# Patient Record
Sex: MALE | Race: WHITE | NOT HISPANIC OR LATINO | Employment: OTHER | URBAN - METROPOLITAN AREA
[De-identification: names, ages, dates, MRNs, and addresses within clinical notes are randomized per-mention and may not be internally consistent; named-entity substitution may affect disease eponyms.]

---

## 2017-02-01 RX ORDER — FUROSEMIDE 20 MG/1
TABLET ORAL
Qty: 30 TABLET | Refills: 3 | Status: SHIPPED | OUTPATIENT
Start: 2017-02-01 | End: 2017-03-21

## 2017-02-13 RX ORDER — CLONIDINE HYDROCHLORIDE 0.1 MG/1
TABLET ORAL
Qty: 60 TABLET | Refills: 0 | OUTPATIENT
Start: 2017-02-13

## 2017-02-22 RX ORDER — BENAZEPRIL HYDROCHLORIDE AND HYDROCHLOROTHIAZIDE 20; 12.5 MG/1; MG/1
TABLET ORAL
Qty: 90 TABLET | Refills: 3 | Status: SHIPPED | OUTPATIENT
Start: 2017-02-22 | End: 2017-10-20 | Stop reason: SDUPTHER

## 2017-03-14 RX ORDER — CLONIDINE HYDROCHLORIDE 0.1 MG/1
TABLET ORAL
Qty: 60 TABLET | Refills: 0 | Status: SHIPPED | OUTPATIENT
Start: 2017-03-14 | End: 2017-10-20 | Stop reason: SDUPTHER

## 2017-03-21 ENCOUNTER — OFFICE VISIT (OUTPATIENT)
Dept: FAMILY MEDICINE CLINIC | Facility: CLINIC | Age: 82
End: 2017-03-21

## 2017-03-21 VITALS
SYSTOLIC BLOOD PRESSURE: 142 MMHG | OXYGEN SATURATION: 92 % | TEMPERATURE: 98.5 F | DIASTOLIC BLOOD PRESSURE: 64 MMHG | HEART RATE: 58 BPM | RESPIRATION RATE: 16 BRPM | WEIGHT: 142.6 LBS | HEIGHT: 66 IN | BODY MASS INDEX: 22.92 KG/M2

## 2017-03-21 DIAGNOSIS — F51.01 PRIMARY INSOMNIA: ICD-10-CM

## 2017-03-21 DIAGNOSIS — R53.82 CHRONIC FATIGUE: ICD-10-CM

## 2017-03-21 DIAGNOSIS — M79.89 SWELLING OF BOTH LOWER EXTREMITIES: ICD-10-CM

## 2017-03-21 DIAGNOSIS — I10 ESSENTIAL HYPERTENSION: Primary | ICD-10-CM

## 2017-03-21 PROCEDURE — 99214 OFFICE O/P EST MOD 30 MIN: CPT | Performed by: INTERNAL MEDICINE

## 2017-03-21 RX ORDER — TRAZODONE HYDROCHLORIDE 50 MG/1
50 TABLET ORAL NIGHTLY
Qty: 30 TABLET | Refills: 5 | Status: SHIPPED | OUTPATIENT
Start: 2017-03-21 | End: 2017-10-20

## 2017-03-21 RX ORDER — FUROSEMIDE 40 MG/1
40 TABLET ORAL DAILY
Qty: 30 TABLET | Refills: 5 | Status: SHIPPED | OUTPATIENT
Start: 2017-03-21 | End: 2018-05-19 | Stop reason: SDUPTHER

## 2017-03-21 NOTE — PROGRESS NOTES
Subjective   Patient ID: Maximo Gonzalez is a 87 y.o. male presents with   Chief Complaint   Patient presents with   • Hypertension     f/u   • Leg Swelling     on both legs and ankles swollen and have been for the past 2 days and he has been taking lasix        HPI -  this patient presents today for follow-up of hypertension lower extremity swelling and insomnia and fatigue and hyperlipidemia.  He has a implanted dye lauded pump and I suspect that is causing his pretty severe lower extremity edema.  Lasix 20 is not handling his edema.  He complains of swelling and pain in his lower extremities from the edema.  Blood pressure is pretty well controlled with current regimen.    Assessment plan    Essential hypertension continue hydralazine and labetalol Lotensin HCT    Fatigue we'll get labs in a few weeks after he's been on his new dose Lasix    Extremity edema increase Lasix to 40 daily reviewing his CMP doesn't appear to need potassium at this point because he is on the high end of normal.  I'll recheck a metabolic panel in a few weeks.    Insomnia-start trazodone 50.    No Known Allergies    The following portions of the patient's history were reviewed and updated as appropriate: allergies, current medications, past family history, past medical history, past social history, past surgical history and problem list.      Review of Systems   Constitutional: Negative.    HENT: Negative.    Eyes: Negative.    Respiratory: Negative.    Cardiovascular: Positive for leg swelling.   Gastrointestinal: Negative.    Endocrine: Negative.    Genitourinary: Negative.    Musculoskeletal: Negative.    Skin: Negative.    Allergic/Immunologic: Negative.    Neurological: Negative.    Hematological: Negative.    Psychiatric/Behavioral: Positive for sleep disturbance.       Objective     Vitals:    03/21/17 1317   BP: 142/64   Pulse: 58   Resp: 16   Temp: 98.5 °F (36.9 °C)   TempSrc: Oral   SpO2: 92%   Weight: 142 lb 9.6 oz (64.7 kg)  "  Height: 66\" (167.6 cm)         Physical Exam   Constitutional: He is oriented to person, place, and time. He appears well-developed and well-nourished.   HENT:   Head: Normocephalic and atraumatic.   Eyes: EOM are normal. Pupils are equal, round, and reactive to light.   Cardiovascular: Normal rate, regular rhythm and normal heart sounds.    Pulmonary/Chest: Effort normal and breath sounds normal.   Musculoskeletal: He exhibits edema.   Neurological: He is alert and oriented to person, place, and time.   Psychiatric: He has a normal mood and affect. His behavior is normal.   Nursing note and vitals reviewed.        Maximo was seen today for hypertension and leg swelling.    Diagnoses and all orders for this visit:    Essential hypertension  -     Lipid Panel  -     Comprehensive Metabolic Panel  -     CBC & Differential    Swelling of both lower extremities  -     Lipid Panel  -     Comprehensive Metabolic Panel  -     CBC & Differential    Primary insomnia  -     Lipid Panel  -     Comprehensive Metabolic Panel  -     CBC & Differential    Chronic fatigue  -     Lipid Panel  -     Comprehensive Metabolic Panel  -     CBC & Differential    Other orders  -     furosemide (LASIX) 40 MG tablet; Take 1 tablet by mouth Daily.  -     traZODone (DESYREL) 50 MG tablet; Take 1 tablet by mouth Every Night.        Call or return to clinic prn if these symptoms worsen or fail to improve as anticipated.  "

## 2017-04-11 LAB
ALBUMIN SERPL-MCNC: 4.6 G/DL (ref 3.5–5.2)
ALBUMIN/GLOB SERPL: 1.8 G/DL
ALP SERPL-CCNC: 80 U/L (ref 39–117)
ALT SERPL-CCNC: 21 U/L (ref 1–41)
AST SERPL-CCNC: 18 U/L (ref 1–40)
BASOPHILS # BLD AUTO: 0.05 10*3/MM3 (ref 0–0.2)
BASOPHILS NFR BLD AUTO: 0.8 % (ref 0–1.5)
BILIRUB SERPL-MCNC: 0.3 MG/DL (ref 0.1–1.2)
BUN SERPL-MCNC: 46 MG/DL (ref 8–23)
BUN/CREAT SERPL: 36.8 (ref 7–25)
CALCIUM SERPL-MCNC: 10.4 MG/DL (ref 8.6–10.5)
CHLORIDE SERPL-SCNC: 99 MMOL/L (ref 98–107)
CHOLEST SERPL-MCNC: 160 MG/DL (ref 0–200)
CO2 SERPL-SCNC: 26.2 MMOL/L (ref 22–29)
CREAT SERPL-MCNC: 1.25 MG/DL (ref 0.76–1.27)
EOSINOPHIL # BLD AUTO: 0.35 10*3/MM3 (ref 0–0.7)
EOSINOPHIL NFR BLD AUTO: 5.5 % (ref 0.3–6.2)
ERYTHROCYTE [DISTWIDTH] IN BLOOD BY AUTOMATED COUNT: 12.9 % (ref 11.5–14.5)
GLOBULIN SER CALC-MCNC: 2.5 GM/DL
GLUCOSE SERPL-MCNC: 114 MG/DL (ref 65–99)
HCT VFR BLD AUTO: 36 % (ref 40.4–52.2)
HDLC SERPL-MCNC: 65 MG/DL (ref 40–60)
HGB BLD-MCNC: 11 G/DL (ref 13.7–17.6)
IMM GRANULOCYTES # BLD: 0 10*3/MM3 (ref 0–0.03)
IMM GRANULOCYTES NFR BLD: 0 % (ref 0–0.5)
LDLC SERPL CALC-MCNC: 73 MG/DL (ref 0–100)
LYMPHOCYTES # BLD AUTO: 2.19 10*3/MM3 (ref 0.9–4.8)
LYMPHOCYTES NFR BLD AUTO: 34.4 % (ref 19.6–45.3)
MCH RBC QN AUTO: 28.7 PG (ref 27–32.7)
MCHC RBC AUTO-ENTMCNC: 30.6 G/DL (ref 32.6–36.4)
MCV RBC AUTO: 94 FL (ref 79.8–96.2)
MONOCYTES # BLD AUTO: 0.64 10*3/MM3 (ref 0.2–1.2)
MONOCYTES NFR BLD AUTO: 10.1 % (ref 5–12)
NEUTROPHILS # BLD AUTO: 3.13 10*3/MM3 (ref 1.9–8.1)
NEUTROPHILS NFR BLD AUTO: 49.2 % (ref 42.7–76)
PLATELET # BLD AUTO: 286 10*3/MM3 (ref 140–500)
POTASSIUM SERPL-SCNC: 5.1 MMOL/L (ref 3.5–5.2)
PROT SERPL-MCNC: 7.1 G/DL (ref 6–8.5)
RBC # BLD AUTO: 3.83 10*6/MM3 (ref 4.6–6)
SODIUM SERPL-SCNC: 141 MMOL/L (ref 136–145)
TRIGL SERPL-MCNC: 110 MG/DL (ref 0–150)
VLDLC SERPL CALC-MCNC: 22 MG/DL (ref 5–40)
WBC # BLD AUTO: 6.36 10*3/MM3 (ref 4.5–10.7)

## 2017-04-13 ENCOUNTER — TELEPHONE (OUTPATIENT)
Dept: FAMILY MEDICINE CLINIC | Facility: CLINIC | Age: 82
End: 2017-04-13

## 2017-04-13 NOTE — TELEPHONE ENCOUNTER
Mary wants you to call her with her dads lab results, she said they are used to [facility] always calling them back with these and since her dad is [place] can we contact her?

## 2017-04-13 NOTE — TELEPHONE ENCOUNTER
Please inform the patient's daughter that carls fasting sugar was 114 creatinine stable at 1.25 total cholesterol good at 160 triglycerides good at 110, mild anemia with a hemoglobin of 11 but it's better than last year.  Liver function test normal.

## 2017-04-20 ENCOUNTER — OFFICE VISIT (OUTPATIENT)
Dept: CARDIOLOGY | Facility: CLINIC | Age: 82
End: 2017-04-20

## 2017-04-20 VITALS
HEART RATE: 57 BPM | HEIGHT: 66 IN | DIASTOLIC BLOOD PRESSURE: 76 MMHG | WEIGHT: 140 LBS | BODY MASS INDEX: 22.5 KG/M2 | SYSTOLIC BLOOD PRESSURE: 150 MMHG

## 2017-04-20 DIAGNOSIS — I10 ESSENTIAL HYPERTENSION: ICD-10-CM

## 2017-04-20 DIAGNOSIS — E78.49 OTHER HYPERLIPIDEMIA: ICD-10-CM

## 2017-04-20 DIAGNOSIS — I25.10 CORONARY ARTERY DISEASE INVOLVING NATIVE CORONARY ARTERY OF NATIVE HEART WITHOUT ANGINA PECTORIS: Primary | ICD-10-CM

## 2017-04-20 DIAGNOSIS — I73.9 PERIPHERAL VASCULAR DISEASE (HCC): ICD-10-CM

## 2017-04-20 PROCEDURE — 99214 OFFICE O/P EST MOD 30 MIN: CPT | Performed by: INTERNAL MEDICINE

## 2017-04-20 PROCEDURE — 93000 ELECTROCARDIOGRAM COMPLETE: CPT | Performed by: INTERNAL MEDICINE

## 2017-04-20 NOTE — PROGRESS NOTES
Date of Office Visit: 17  Encounter Provider: Jaxon Shaw MD  Place of Service: [facility] CARDIOLOGY  Patient Name: Maximo Gonzalez  :3/9/1930      Chief Complaint   Patient presents with   • Coronary Artery Disease     History of Present Illness  HPI Comments:  Mr. Gonzalez is a pleasant 87-year-old gentleman with a history of coronary  artery disease. The patient had positive stress testing. Cardiac  catheterization showed severe disease of the left anterior descending. He  had angioplasty and stent placement in two areas of the LAD in 2001. He then had a positive perfusion study with evidence of restenosis,  repeat angioplasty of the LAD back in 2002. Then in 2005, he  had increasing atypical symptoms, hypertension and repeat catheterization  showed no real significant coronary disease normal LV function and normal  renal arteriogram. In 2006, he was having claudication and he was  found to have right superficial femoral stenosis. He had atherectomy of  that. He most recently was in the hospital for repeat right carotid stenosis  with repeat right carotid endarterectomy. He comes in for followup .  He has had no chest pain or pressure. No shortness of breath orthopnea or PND.  No palpitations near syncope or syncope.  He has had some lower extremity edemas diuretics were recently increased.  He is try to walk now 15 miles a day around his condo place.  He does live alone but his daughter helps care for him a lot.  He is continuing to lose his vision and hearing but all in all he feels like he's stable.      Coronary Artery Disease   Pertinent negatives include no dizziness, muscle weakness or weight gain. There is no history of past myocardial infarction.         Past Medical History:   Diagnosis Date   • Atherosclerotic heart disease of native coronary artery without angina pectoris     Normal LV systolic function.   • BPH (benign  prostatic hypertrophy)    • Chronic pain syndrome    • Colon cancer    • Degeneration of cervical intervertebral disc     Cervical disk disease.   • Gastritis    • Hypertension    • Renal artery stenosis     renal artery disease   • Stroke    • Vascular disease          Past Surgical History:   Procedure Laterality Date   • ATHERECTOMY Right     superficial femoral artery   • CAROTID ENDARTERECTOMY      Right carotid endarterectomy. ReOP 8/11   • CERVICAL LAMINECTOMY     • CHOLECYSTECTOMY     • COLECTOMY PARTIAL / TOTAL      colon resection   • CORONARY ANGIOPLASTY WITH STENT PLACEMENT      Angioplasty and stent placement in LAD and evidence of restenosis w/ repeat angioplasty in April 2002.  Cath in 2005 showed no evidence of restenosis.   • LAPAROSCOPIC INGUINAL HERNIA REPAIR Left 02/19/2016    Dr. Neil Thomas   • OTHER SURGICAL HISTORY      Bypass Graft (non-vein) iliorenal   • TURP / TRANSURETHRAL INCISION / DRAINAGE PROSTATE           Current Outpatient Prescriptions on File Prior to Visit   Medication Sig Dispense Refill   • aspirin 81 MG tablet Take 1 tablet by mouth daily.     • benazepril-hydrochlorthiazide (LOTENSIN HCT) 20-12.5 MG per tablet TAKE ONE TABLET BY MOUTH ONCE DAILY 90 tablet 3   • CloNIDine (CATAPRES) 0.1 MG tablet TAKE ONE TABLET BY MOUTH TWICE DAILY AS NEEDED FOR VERY HIGH BLOOD PRESSURE 60 tablet 0   • furosemide (LASIX) 40 MG tablet Take 1 tablet by mouth Daily. 30 tablet 5   • hydrALAZINE (APRESOLINE) 50 MG tablet TAKE ONE TABLET BY MOUTH TWICE DAILY 60 tablet 2   • labetalol (NORMODYNE) 200 MG tablet Take 1 tablet by mouth 2 (Two) Times a Day. Indications: High Blood Pressure, Generic version please (Patient taking differently: Take 200 mg by mouth Daily. Indications: High Blood Pressure, Generic version please) 60 tablet 5   • oxyCODONE-acetaminophen (PERCOCET) 5-325 MG per tablet Take 1 tablet by mouth every 4 (four) hours as needed. For pain     • promethazine (PHENERGAN) 25 MG  tablet Take 1 tablet by mouth 2 (two) times a day as needed. For nausea and vomiting     • traZODone (DESYREL) 50 MG tablet Take 1 tablet by mouth Every Night. 30 tablet 5     No current facility-administered medications on file prior to visit.          Social History     Social History   • Marital status:      Spouse name: N/A   • Number of children: N/A   • Years of education: N/A     Occupational History   • Not on file.     Social History Main Topics   • Smoking status: Former Smoker     Types: Cigarettes     Quit date: 1/27/1986   • Smokeless tobacco: Never Used   • Alcohol use No   • Drug use: No   • Sexual activity: Defer     Other Topics Concern   • Not on file     Social History Narrative       Family History   Problem Relation Age of Onset   • Cancer Mother      Breast   • No Known Problems Father          Review of Systems   Constitution: Negative for decreased appetite, diaphoresis, fever, weakness, malaise/fatigue, weight gain and weight loss.   HENT: Positive for hearing loss. Negative for congestion, headaches, nosebleeds and tinnitus.    Eyes: Positive for vision loss in left eye and vision loss in right eye. Negative for blurred vision, double vision and visual disturbance.   Cardiovascular:        As noted in HPI   Respiratory:        As noted HPI   Endocrine: Negative for cold intolerance and heat intolerance.   Hematologic/Lymphatic: Negative for bleeding problem. Does not bruise/bleed easily.   Skin: Negative for color change, flushing, itching and rash.   Musculoskeletal: Negative for arthritis, back pain, joint pain, joint swelling, muscle weakness and myalgias.   Gastrointestinal: Negative for bloating, abdominal pain, constipation, diarrhea, dysphagia, heartburn, hematemesis, hematochezia, melena, nausea and vomiting.   Genitourinary: Negative for bladder incontinence, dysuria, frequency, nocturia and urgency.   Neurological: Negative for dizziness, focal weakness, light-headedness,  "loss of balance, numbness, paresthesias and vertigo.   Psychiatric/Behavioral: Negative for depression, memory loss and substance abuse.       Procedures      ECG 12 Lead  Date/Time: 4/20/2017 2:39 PM  Performed by: RONY FRANKLIN  Authorized by: RONY FRANKLIN   Comparison: compared with previous ECG   Similar to previous ECG  Rhythm: sinus rhythm  Rate: normal  QRS axis: normal                 Objective:    /76 (BP Location: Left arm, Patient Position: Sitting)  Pulse 57  Ht 66\" (167.6 cm)  Wt 140 lb (63.5 kg)  BMI 22.6 kg/m2       Physical Exam  Physical Exam   Constitutional: He is oriented to person, place, and time. He appears well-developed and well-nourished. No distress.   HENT:   Head: Normocephalic.   Eyes: Conjunctivae are normal. Pupils are equal, round, and reactive to light. No scleral icterus.   Neck: Normal carotid pulses, no hepatojugular reflux and no JVD present. Carotid bruit is not present. No tracheal deviation, no edema and no erythema present. No thyromegaly present.   Cardiovascular: Normal rate, regular rhythm, S1 normal, S2 normal and intact distal pulses.   No extrasystoles are present. PMI is not displaced.  Exam reveals no gallop, no distant heart sounds and no friction rub.    Murmur heard.   Systolic murmur is present with a grade of 2/6  at the apex  Pulses:       Carotid pulses are 2+ on the right side with bruit, and 2+ on the left side with bruit.       Radial pulses are 2+ on the right side, and 2+ on the left side.        Femoral pulses are 2+ on the right side, and 2+ on the left side.       Dorsalis pedis pulses are 2+ on the right side, and 2+ on the left side.        Posterior tibial pulses are 2+ on the right side, and 2+ on the left side.   Pulmonary/Chest: Effort normal and breath sounds normal. No respiratory distress. He has no decreased breath sounds. He has no wheezes. He has no rhonchi. He has no rales. He exhibits no tenderness.   Abdominal: Soft. " Bowel sounds are normal. He exhibits no distension and no mass. There is no hepatosplenomegaly. There is no tenderness. There is no rebound and no guarding.   Musculoskeletal: He exhibits edema (1+ bilateral tibial). He exhibits no tenderness or deformity.   Neurological: He is alert and oriented to person, place, and time.   Skin: Skin is warm and dry. No rash noted. He is not diaphoretic. No cyanosis or erythema. No pallor. Nails show no clubbing.   Psychiatric: He has a normal mood and affect. His speech is normal and behavior is normal. Judgment and thought content normal.           Assessment:   1. This an 85-year-old gentleman with history of severe coronary disease, preserved left ventricular systolic function, and previous angioplasty and  stent placement of the left anterior descending. He then had evidence of restenosis with repeat angioplasty in April 2002. Catheterization in 2005  showed no evidence of restenosis. He had a PET perfusion study done in March 2011 that showed no evidence of ischemia and preserved left ventricular  function.   Coronary Artery Disease  Assessment  • The patient has no angina    Plan  • Lifestyle modifications discussed include adhering to a heart healthy diet, avoidance of tobacco products, maintenance of a healthy weight, medication compliance, regular exercise and regular monitoring of cholesterol and blood pressure    Subjective - Objective  • There has been a previous stent procedure using OCTAVIA  • Current antiplatelet therapy includes aspirin 81 mg    He is now doing well. At this time, we will continue the same and see him again in follow-up in about a year.    2. Peripheral vascular disease status post atherectomy of the right superficial femoral artery. He is to follow up with vascular surgery.  3. Cerebrovascular disease status post right carotid endarterectomy.  Restenosis with repeat right carotid endarterectomy in August 2011. He follows with vascular surgery.  4.  History of renal artery stenosis status post renal artery bypass with no evidence of restenosis.  5. History of colon cancer status post colon resection.  6. Hyperlipidemia. I still feel that he would benefit from statin therapy.  7.Hypertension.   BP under better control.         Plan:

## 2017-05-11 RX ORDER — HYDRALAZINE HYDROCHLORIDE 50 MG/1
TABLET, FILM COATED ORAL
Qty: 60 TABLET | Refills: 0 | Status: SHIPPED | OUTPATIENT
Start: 2017-05-11 | End: 2017-07-26 | Stop reason: SDUPTHER

## 2017-07-18 RX ORDER — PROMETHAZINE HYDROCHLORIDE 25 MG/1
TABLET ORAL
Qty: 60 TABLET | Refills: 0 | Status: SHIPPED | OUTPATIENT
Start: 2017-07-18 | End: 2017-10-20 | Stop reason: SDUPTHER

## 2017-07-26 RX ORDER — HYDRALAZINE HYDROCHLORIDE 50 MG/1
TABLET, FILM COATED ORAL
Qty: 60 TABLET | Refills: 0 | Status: SHIPPED | OUTPATIENT
Start: 2017-07-26 | End: 2017-09-26 | Stop reason: SDUPTHER

## 2017-09-26 RX ORDER — HYDRALAZINE HYDROCHLORIDE 50 MG/1
TABLET, FILM COATED ORAL
Qty: 60 TABLET | Refills: 0 | Status: SHIPPED | OUTPATIENT
Start: 2017-09-26 | End: 2017-10-20 | Stop reason: SDUPTHER

## 2017-10-20 ENCOUNTER — ANESTHESIA EVENT (OUTPATIENT)
Dept: PERIOP | Facility: HOSPITAL | Age: 82
End: 2017-10-20

## 2017-10-20 ENCOUNTER — EMERGENCY ROOM – HOSPITAL (OUTPATIENT)
Dept: URBAN - METROPOLITAN AREA HOSPITAL 112 | Facility: HOSPITAL | Age: 82
End: 2017-10-20

## 2017-10-20 ENCOUNTER — HOSPITAL ENCOUNTER (EMERGENCY)
Facility: HOSPITAL | Age: 82
Discharge: HOME | End: 2017-10-20
Attending: EMERGENCY MEDICINE | Admitting: INTERNAL MEDICINE

## 2017-10-20 ENCOUNTER — APPOINTMENT (OUTPATIENT)
Dept: GENERAL RADIOLOGY | Facility: HOSPITAL | Age: 82
End: 2017-10-20
Attending: EMERGENCY MEDICINE

## 2017-10-20 ENCOUNTER — APPOINTMENT (OUTPATIENT)
Dept: GENERAL RADIOLOGY | Facility: HOSPITAL | Age: 82
End: 2017-10-20

## 2017-10-20 ENCOUNTER — ANESTHESIA (OUTPATIENT)
Dept: PERIOP | Facility: HOSPITAL | Age: 82
End: 2017-10-20

## 2017-10-20 VITALS
HEART RATE: 69 BPM | HEIGHT: 66 IN | WEIGHT: 140 LBS | TEMPERATURE: 97.9 F | SYSTOLIC BLOOD PRESSURE: 187 MMHG | BODY MASS INDEX: 22.5 KG/M2 | DIASTOLIC BLOOD PRESSURE: 82 MMHG | OXYGEN SATURATION: 98 % | RESPIRATION RATE: 16 BRPM

## 2017-10-20 DIAGNOSIS — K22.2 ESOPHAGEAL OBSTRUCTION DUE TO FOOD IMPACTION: Primary | ICD-10-CM

## 2017-10-20 DIAGNOSIS — K22.2 ESOPHAGEAL OBSTRUCTION: ICD-10-CM

## 2017-10-20 DIAGNOSIS — T18.128A ESOPHAGEAL OBSTRUCTION DUE TO FOOD IMPACTION: Primary | ICD-10-CM

## 2017-10-20 DIAGNOSIS — K31.7 POLYP OF STOMACH AND DUODENUM: ICD-10-CM

## 2017-10-20 DIAGNOSIS — T18.128A FOOD IN ESOPHAGUS CAUSING OTHER INJURY, INITIAL ENCOUNTER: ICD-10-CM

## 2017-10-20 LAB
ALBUMIN SERPL-MCNC: 4.6 G/DL (ref 3.5–5.2)
ALBUMIN/GLOB SERPL: 1.6 G/DL
ALP SERPL-CCNC: 74 U/L (ref 39–117)
ALT SERPL W P-5'-P-CCNC: 21 U/L (ref 1–41)
ANION GAP SERPL CALCULATED.3IONS-SCNC: 14 MMOL/L
AST SERPL-CCNC: 25 U/L (ref 1–40)
BASOPHILS # BLD AUTO: 0.03 10*3/MM3 (ref 0–0.2)
BASOPHILS NFR BLD AUTO: 0.5 % (ref 0–1.5)
BILIRUB SERPL-MCNC: 0.6 MG/DL (ref 0.1–1.2)
BUN BLD-MCNC: 28 MG/DL (ref 8–23)
BUN/CREAT SERPL: 26.7 (ref 7–25)
CALCIUM SPEC-SCNC: 10.9 MG/DL (ref 8.6–10.5)
CHLORIDE SERPL-SCNC: 98 MMOL/L (ref 98–107)
CO2 SERPL-SCNC: 29 MMOL/L (ref 22–29)
CREAT BLD-MCNC: 1.05 MG/DL (ref 0.76–1.27)
DEPRECATED RDW RBC AUTO: 44.3 FL (ref 37–54)
EOSINOPHIL # BLD AUTO: 0.02 10*3/MM3 (ref 0–0.7)
EOSINOPHIL NFR BLD AUTO: 0.3 % (ref 0.3–6.2)
ERYTHROCYTE [DISTWIDTH] IN BLOOD BY AUTOMATED COUNT: 13.1 % (ref 11.5–14.5)
GFR SERPL CREATININE-BSD FRML MDRD: 67 ML/MIN/1.73
GLOBULIN UR ELPH-MCNC: 2.8 GM/DL
GLUCOSE BLD-MCNC: 123 MG/DL (ref 65–99)
HCT VFR BLD AUTO: 35.5 % (ref 40.4–52.2)
HGB BLD-MCNC: 11 G/DL (ref 13.7–17.6)
HOLD SPECIMEN: NORMAL
HOLD SPECIMEN: NORMAL
IMM GRANULOCYTES # BLD: 0.03 10*3/MM3 (ref 0–0.03)
IMM GRANULOCYTES NFR BLD: 0.5 % (ref 0–0.5)
LIPASE SERPL-CCNC: 8 U/L (ref 13–60)
LYMPHOCYTES # BLD AUTO: 0.98 10*3/MM3 (ref 0.9–4.8)
LYMPHOCYTES NFR BLD AUTO: 15.6 % (ref 19.6–45.3)
MCH RBC QN AUTO: 28.6 PG (ref 27–32.7)
MCHC RBC AUTO-ENTMCNC: 31 G/DL (ref 32.6–36.4)
MCV RBC AUTO: 92.4 FL (ref 79.8–96.2)
MONOCYTES # BLD AUTO: 0.3 10*3/MM3 (ref 0.2–1.2)
MONOCYTES NFR BLD AUTO: 4.8 % (ref 5–12)
NEUTROPHILS # BLD AUTO: 4.92 10*3/MM3 (ref 1.9–8.1)
NEUTROPHILS NFR BLD AUTO: 78.3 % (ref 42.7–76)
PLATELET # BLD AUTO: 287 10*3/MM3 (ref 140–500)
PMV BLD AUTO: 10.1 FL (ref 6–12)
POTASSIUM BLD-SCNC: 4.1 MMOL/L (ref 3.5–5.2)
PROT SERPL-MCNC: 7.4 G/DL (ref 6–8.5)
RBC # BLD AUTO: 3.84 10*6/MM3 (ref 4.6–6)
SODIUM BLD-SCNC: 141 MMOL/L (ref 136–145)
TROPONIN T SERPL-MCNC: 0.01 NG/ML (ref 0–0.03)
WBC NRBC COR # BLD: 6.28 10*3/MM3 (ref 4.5–10.7)
WHOLE BLOOD HOLD SPECIMEN: NORMAL
WHOLE BLOOD HOLD SPECIMEN: NORMAL

## 2017-10-20 PROCEDURE — 85025 COMPLETE CBC W/AUTO DIFF WBC: CPT | Performed by: NURSE PRACTITIONER

## 2017-10-20 PROCEDURE — 25010000002 MIDAZOLAM PER 1 MG: Performed by: ANESTHESIOLOGY

## 2017-10-20 PROCEDURE — 25010000002 SUCCINYLCHOLINE PER 20 MG: Performed by: ANESTHESIOLOGY

## 2017-10-20 PROCEDURE — 83690 ASSAY OF LIPASE: CPT | Performed by: NURSE PRACTITIONER

## 2017-10-20 PROCEDURE — 43247 EGD REMOVE FOREIGN BODY: CPT

## 2017-10-20 PROCEDURE — 93010 ELECTROCARDIOGRAM REPORT: CPT | Performed by: INTERNAL MEDICINE

## 2017-10-20 PROCEDURE — 74220 X-RAY XM ESOPHAGUS 1CNTRST: CPT

## 2017-10-20 PROCEDURE — 43249 ESOPH EGD DILATION <30 MM: CPT | Mod: 59

## 2017-10-20 PROCEDURE — 71020 HC CHEST PA AND LATERAL: CPT

## 2017-10-20 PROCEDURE — 25010000002 DEXAMETHASONE PER 1 MG: Performed by: NURSE ANESTHETIST, CERTIFIED REGISTERED

## 2017-10-20 PROCEDURE — 25010000002 PROPOFOL 10 MG/ML EMULSION: Performed by: ANESTHESIOLOGY

## 2017-10-20 PROCEDURE — 84484 ASSAY OF TROPONIN QUANT: CPT | Performed by: NURSE PRACTITIONER

## 2017-10-20 PROCEDURE — 25010000002 ONDANSETRON PER 1 MG: Performed by: NURSE ANESTHETIST, CERTIFIED REGISTERED

## 2017-10-20 PROCEDURE — 99284 EMERGENCY DEPT VISIT MOD MDM: CPT

## 2017-10-20 PROCEDURE — 93005 ELECTROCARDIOGRAM TRACING: CPT

## 2017-10-20 PROCEDURE — C1726 CATH, BAL DIL, NON-VASCULAR: HCPCS | Performed by: INTERNAL MEDICINE

## 2017-10-20 PROCEDURE — 80053 COMPREHEN METABOLIC PANEL: CPT | Performed by: NURSE PRACTITIONER

## 2017-10-20 PROCEDURE — 96374 THER/PROPH/DIAG INJ IV PUSH: CPT

## 2017-10-20 RX ORDER — SUCCINYLCHOLINE CHLORIDE 20 MG/ML
INJECTION INTRAMUSCULAR; INTRAVENOUS AS NEEDED
Status: DISCONTINUED | OUTPATIENT
Start: 2017-10-20 | End: 2017-10-20 | Stop reason: SURG

## 2017-10-20 RX ORDER — DEXAMETHASONE SODIUM PHOSPHATE 4 MG/ML
INJECTION, SOLUTION INTRA-ARTICULAR; INTRALESIONAL; INTRAMUSCULAR; INTRAVENOUS; SOFT TISSUE AS NEEDED
Status: DISCONTINUED | OUTPATIENT
Start: 2017-10-20 | End: 2017-10-20 | Stop reason: SURG

## 2017-10-20 RX ORDER — HYDRALAZINE HYDROCHLORIDE 50 MG/1
50 TABLET, FILM COATED ORAL 2 TIMES DAILY
COMMUNITY
End: 2017-10-30 | Stop reason: SDUPTHER

## 2017-10-20 RX ORDER — PROPOFOL 10 MG/ML
VIAL (ML) INTRAVENOUS AS NEEDED
Status: DISCONTINUED | OUTPATIENT
Start: 2017-10-20 | End: 2017-10-20 | Stop reason: SURG

## 2017-10-20 RX ORDER — LIDOCAINE HYDROCHLORIDE 20 MG/ML
INJECTION, SOLUTION INFILTRATION; PERINEURAL AS NEEDED
Status: DISCONTINUED | OUTPATIENT
Start: 2017-10-20 | End: 2017-10-20 | Stop reason: SURG

## 2017-10-20 RX ORDER — LIDOCAINE HYDROCHLORIDE 10 MG/ML
0.5 INJECTION, SOLUTION EPIDURAL; INFILTRATION; INTRACAUDAL; PERINEURAL ONCE AS NEEDED
Status: DISCONTINUED | OUTPATIENT
Start: 2017-10-20 | End: 2017-10-20 | Stop reason: HOSPADM

## 2017-10-20 RX ORDER — MIDAZOLAM HYDROCHLORIDE 1 MG/ML
1 INJECTION INTRAMUSCULAR; INTRAVENOUS
Status: DISCONTINUED | OUTPATIENT
Start: 2017-10-20 | End: 2017-10-20 | Stop reason: HOSPADM

## 2017-10-20 RX ORDER — ONDANSETRON 2 MG/ML
4 INJECTION INTRAMUSCULAR; INTRAVENOUS ONCE AS NEEDED
Status: DISCONTINUED | OUTPATIENT
Start: 2017-10-20 | End: 2017-10-20 | Stop reason: HOSPADM

## 2017-10-20 RX ORDER — CLONIDINE HYDROCHLORIDE 0.1 MG/1
0.1 TABLET ORAL 2 TIMES DAILY PRN
COMMUNITY
End: 2018-11-20 | Stop reason: SDUPTHER

## 2017-10-20 RX ORDER — OMEPRAZOLE 40 MG/1
40 CAPSULE, DELAYED RELEASE ORAL DAILY
Qty: 90 CAPSULE | Refills: 3 | Status: SHIPPED | OUTPATIENT
Start: 2017-10-20 | End: 2023-01-01

## 2017-10-20 RX ORDER — PROMETHAZINE HYDROCHLORIDE 25 MG/1
25 TABLET ORAL 2 TIMES DAILY PRN
COMMUNITY
End: 2018-11-21 | Stop reason: SDUPTHER

## 2017-10-20 RX ORDER — SODIUM CHLORIDE 0.9 % (FLUSH) 0.9 %
10 SYRINGE (ML) INJECTION AS NEEDED
Status: DISCONTINUED | OUTPATIENT
Start: 2017-10-20 | End: 2017-10-20 | Stop reason: HOSPADM

## 2017-10-20 RX ORDER — GLYCOPYRROLATE 0.2 MG/ML
0.2 INJECTION INTRAMUSCULAR; INTRAVENOUS
Status: DISCONTINUED | OUTPATIENT
Start: 2017-10-20 | End: 2017-10-20 | Stop reason: HOSPADM

## 2017-10-20 RX ORDER — FLUMAZENIL 0.1 MG/ML
0.2 INJECTION INTRAVENOUS AS NEEDED
Status: DISCONTINUED | OUTPATIENT
Start: 2017-10-20 | End: 2017-10-20 | Stop reason: HOSPADM

## 2017-10-20 RX ORDER — HYDROCODONE BITARTRATE AND ACETAMINOPHEN 7.5; 325 MG/1; MG/1
1 TABLET ORAL ONCE AS NEEDED
Status: DISCONTINUED | OUTPATIENT
Start: 2017-10-20 | End: 2017-10-20 | Stop reason: HOSPADM

## 2017-10-20 RX ORDER — FENTANYL CITRATE 50 UG/ML
50 INJECTION, SOLUTION INTRAMUSCULAR; INTRAVENOUS
Status: DISCONTINUED | OUTPATIENT
Start: 2017-10-20 | End: 2017-10-20 | Stop reason: HOSPADM

## 2017-10-20 RX ORDER — MIDAZOLAM HYDROCHLORIDE 1 MG/ML
2 INJECTION INTRAMUSCULAR; INTRAVENOUS
Status: DISCONTINUED | OUTPATIENT
Start: 2017-10-20 | End: 2017-10-20 | Stop reason: HOSPADM

## 2017-10-20 RX ORDER — OXYCODONE HYDROCHLORIDE AND ACETAMINOPHEN 5; 325 MG/1; MG/1
2 TABLET ORAL ONCE AS NEEDED
Status: DISCONTINUED | OUTPATIENT
Start: 2017-10-20 | End: 2017-10-20 | Stop reason: HOSPADM

## 2017-10-20 RX ORDER — SODIUM CHLORIDE, SODIUM LACTATE, POTASSIUM CHLORIDE, CALCIUM CHLORIDE 600; 310; 30; 20 MG/100ML; MG/100ML; MG/100ML; MG/100ML
9 INJECTION, SOLUTION INTRAVENOUS CONTINUOUS
Status: DISCONTINUED | OUTPATIENT
Start: 2017-10-20 | End: 2017-10-20 | Stop reason: HOSPADM

## 2017-10-20 RX ORDER — ONDANSETRON 2 MG/ML
INJECTION INTRAMUSCULAR; INTRAVENOUS AS NEEDED
Status: DISCONTINUED | OUTPATIENT
Start: 2017-10-20 | End: 2017-10-20 | Stop reason: SURG

## 2017-10-20 RX ORDER — FAMOTIDINE 10 MG/ML
20 INJECTION, SOLUTION INTRAVENOUS ONCE
Status: COMPLETED | OUTPATIENT
Start: 2017-10-20 | End: 2017-10-20

## 2017-10-20 RX ORDER — SODIUM CHLORIDE 0.9 % (FLUSH) 0.9 %
1-10 SYRINGE (ML) INJECTION AS NEEDED
Status: DISCONTINUED | OUTPATIENT
Start: 2017-10-20 | End: 2017-10-20 | Stop reason: HOSPADM

## 2017-10-20 RX ORDER — LABETALOL HYDROCHLORIDE 5 MG/ML
5 INJECTION, SOLUTION INTRAVENOUS
Status: DISCONTINUED | OUTPATIENT
Start: 2017-10-20 | End: 2017-10-20 | Stop reason: HOSPADM

## 2017-10-20 RX ORDER — DIPHENHYDRAMINE HYDROCHLORIDE 50 MG/ML
6.25 INJECTION INTRAMUSCULAR; INTRAVENOUS
Status: DISCONTINUED | OUTPATIENT
Start: 2017-10-20 | End: 2017-10-20 | Stop reason: HOSPADM

## 2017-10-20 RX ORDER — HYDRALAZINE HYDROCHLORIDE 20 MG/ML
5 INJECTION INTRAMUSCULAR; INTRAVENOUS
Status: DISCONTINUED | OUTPATIENT
Start: 2017-10-20 | End: 2017-10-20 | Stop reason: HOSPADM

## 2017-10-20 RX ORDER — FENTANYL CITRATE 50 UG/ML
100 INJECTION, SOLUTION INTRAMUSCULAR; INTRAVENOUS
Status: DISCONTINUED | OUTPATIENT
Start: 2017-10-20 | End: 2017-10-20 | Stop reason: HOSPADM

## 2017-10-20 RX ORDER — HYDROMORPHONE HYDROCHLORIDE 1 MG/ML
0.5 INJECTION, SOLUTION INTRAMUSCULAR; INTRAVENOUS; SUBCUTANEOUS
Status: DISCONTINUED | OUTPATIENT
Start: 2017-10-20 | End: 2017-10-20 | Stop reason: HOSPADM

## 2017-10-20 RX ORDER — BENAZEPRIL HYDROCHLORIDE AND HYDROCHLOROTHIAZIDE 20; 12.5 MG/1; MG/1
1 TABLET ORAL DAILY
COMMUNITY
End: 2018-11-20

## 2017-10-20 RX ORDER — EPHEDRINE SULFATE 50 MG/ML
5 INJECTION, SOLUTION INTRAVENOUS ONCE AS NEEDED
Status: DISCONTINUED | OUTPATIENT
Start: 2017-10-20 | End: 2017-10-20 | Stop reason: HOSPADM

## 2017-10-20 RX ADMIN — FAMOTIDINE 20 MG: 10 INJECTION, SOLUTION INTRAVENOUS at 17:20

## 2017-10-20 RX ADMIN — LIDOCAINE HYDROCHLORIDE 60 MG: 20 INJECTION, SOLUTION INFILTRATION; PERINEURAL at 17:37

## 2017-10-20 RX ADMIN — METOPROLOL TARTRATE 5 MG: 5 INJECTION INTRAVENOUS at 15:37

## 2017-10-20 RX ADMIN — MIDAZOLAM 1 MG: 1 INJECTION INTRAMUSCULAR; INTRAVENOUS at 17:22

## 2017-10-20 RX ADMIN — ONDANSETRON 4 MG: 2 INJECTION INTRAMUSCULAR; INTRAVENOUS at 18:45

## 2017-10-20 RX ADMIN — DEXAMETHASONE SODIUM PHOSPHATE 6 MG: 4 INJECTION, SOLUTION INTRAMUSCULAR; INTRAVENOUS at 17:50

## 2017-10-20 RX ADMIN — SUCCINYLCHOLINE CHLORIDE 80 MG: 20 INJECTION, SOLUTION INTRAMUSCULAR; INTRAVENOUS; PARENTERAL at 17:37

## 2017-10-20 RX ADMIN — SODIUM CHLORIDE, POTASSIUM CHLORIDE, SODIUM LACTATE AND CALCIUM CHLORIDE 9 ML/HR: 600; 310; 30; 20 INJECTION, SOLUTION INTRAVENOUS at 17:22

## 2017-10-20 RX ADMIN — GLYCOPYRROLATE 0.2 MG: 0.2 INJECTION, SOLUTION INTRAMUSCULAR; INTRAVENOUS at 17:22

## 2017-10-20 RX ADMIN — PROPOFOL 100 MG: 10 INJECTION, EMULSION INTRAVENOUS at 17:37

## 2017-10-20 NOTE — PLAN OF CARE
Problem: Perioperative Period (Adult)  Goal: Signs and Symptoms of Listed Potential Problems Will be Absent or Manageable (Perioperative Period)  Outcome: Ongoing (interventions implemented as appropriate)    10/20/17 9833   Perioperative Period   Problems Assessed (Perioperative Period) situational response   Problems Present (Perioperative Period) situational response

## 2017-10-20 NOTE — PLAN OF CARE
Problem: Patient Care Overview (Adult)  Goal: Plan of Care Review  Outcome: Outcome(s) achieved Date Met:  10/20/17    10/20/17 1952   Coping/Psychosocial Response Interventions   Plan Of Care Reviewed With patient;daughter   Patient Care Overview   Progress improving   Outcome Evaluation   Outcome Summary/Follow up Plan ready for discharge       Goal: Adult Individualization and Mutuality  Outcome: Outcome(s) achieved Date Met:  10/20/17  Goal: Discharge Needs Assessment  Outcome: Outcome(s) achieved Date Met:  10/20/17    Problem: Perioperative Period (Adult)  Goal: Signs and Symptoms of Listed Potential Problems Will be Absent or Manageable (Perioperative Period)  Outcome: Outcome(s) achieved Date Met:  10/20/17    10/20/17 1952   Perioperative Period   Problems Assessed (Perioperative Period) all   Problems Present (Perioperative Period) none

## 2017-10-20 NOTE — ED PROVIDER NOTES
EMERGENCY DEPARTMENT ENCOUNTER    CHIEF COMPLAINT  Chief Complaint: Epigastric pain  History given by:patient  History limited by:nothing  Room Number: [facility] OR/MAIN OR  PMD: Larry Garza MD      HPI:  Pt is a 87 y.o. male who presents with pain in his epigastric region. He states that any time he swallows something, even small things, it is painful and works its way back up into his mouth. Pt states that this began yesterday night and that it is only painful when he eats or drinks. He vomited last night and that briefly resolves his symptoms.  Patient denies fever, chills, and SOA, .He denies choking on anything recently.  Past Medical History of hypertension    Duration: one day  Timing:constant  Location:epigastric region  Radiation:none  Quality:none  Intensity/Severity:moderate  Progression:unchanged  Associated Symptoms:vomiting  Aggravating Factors: eating or drinking  Alleviating Factors:none  Previous Episodes:none  Treatment before arrival:none    PAST MEDICAL HISTORY  Active Ambulatory Problems     Diagnosis Date Noted   • Chronic pain syndrome 03/11/2016   • Hyperlipidemia 03/11/2016   • Hypertension 03/11/2016   • Peripheral vascular disease 03/11/2016   • High risk medication use 03/29/2016   • Swelling of both lower extremities 03/29/2016   • Primary insomnia 03/21/2017   • Chronic fatigue 03/21/2017     Resolved Ambulatory Problems     Diagnosis Date Noted   • No Resolved Ambulatory Problems     Past Medical History:   Diagnosis Date   • Atherosclerotic heart disease of native coronary artery without angina pectoris    • BPH (benign prostatic hypertrophy)    • Chronic pain syndrome    • Colon cancer    • Degeneration of cervical intervertebral disc    • Gastritis    • Hypertension    • Renal artery stenosis    • Stroke    • Vascular disease        PAST SURGICAL HISTORY  Past Surgical History:   Procedure Laterality Date   • ATHERECTOMY Right     superficial femoral artery   • CAROTID  ENDARTERECTOMY      Right carotid endarterectomy. ReOP 8/11   • CERVICAL LAMINECTOMY     • CHOLECYSTECTOMY     • COLECTOMY PARTIAL / TOTAL      colon resection   • CORONARY ANGIOPLASTY WITH STENT PLACEMENT      Angioplasty and stent placement in LAD and evidence of restenosis w/ repeat angioplasty in April 2002.  Cath in 2005 showed no evidence of restenosis.   • LAPAROSCOPIC INGUINAL HERNIA REPAIR Left 02/19/2016    Dr. Neil Thomas   • OTHER SURGICAL HISTORY      Bypass Graft (non-vein) iliorenal   • PAIN PUMP INSERTION/REVISION Left 2015    dilaudid    • TURP / TRANSURETHRAL INCISION / DRAINAGE PROSTATE         FAMILY HISTORY  Family History   Problem Relation Age of Onset   • Cancer Mother      Breast   • No Known Problems Father        SOCIAL HISTORY  Social History     Social History   • Marital status:      Spouse name: N/A   • Number of children: N/A   • Years of education: N/A     Occupational History   • Not on file.     Social History Main Topics   • Smoking status: Former Smoker     Types: Cigarettes     Quit date: 1/27/1986   • Smokeless tobacco: Never Used   • Alcohol use No   • Drug use: No   • Sexual activity: Defer     Other Topics Concern   • Not on file     Social History Narrative         ALLERGIES  Promethazine    REVIEW OF SYSTEMS  Review of Systems   Constitutional: Negative for chills and fever.   HENT: Negative for sore throat.    Respiratory: Negative for shortness of breath.    Cardiovascular: Positive for chest pain (epigastric region).   Gastrointestinal: Positive for vomiting. Negative for nausea.   Genitourinary: Negative for dysuria.   Musculoskeletal: Negative for back pain.   Skin: Negative for rash.   Neurological: Negative for dizziness.   Psychiatric/Behavioral: The patient is not nervous/anxious.        PHYSICAL EXAM  ED Triage Vitals   Temp Heart Rate Resp BP SpO2   10/20/17 1148 10/20/17 1148 10/20/17 1148 10/20/17 1221 10/20/17 1148   98.9 °F (37.2 °C) 94 16 207/90  91 %       Physical Exam   Constitutional: He is well-developed, well-nourished, and in no distress. No distress.   Chronically ill appearing.   HENT:   Head: Atraumatic.   Mouth/Throat: Mucous membranes are normal.   Eyes: No scleral icterus.   Neck: Normal range of motion.   Cardiovascular: Normal rate, regular rhythm and normal heart sounds.    Pulmonary/Chest: Effort normal and breath sounds normal.   Abdominal: Bowel sounds are normal. There is tenderness in the epigastric area.   Musculoskeletal: Normal range of motion.   Neurological: He is alert.   Skin: Skin is warm and dry.   Psychiatric: Mood and affect normal.   Nursing note and vitals reviewed.      LAB RESULTS  Recent Results (from the past 24 hour(s))   Light Blue Top    Collection Time: 10/20/17 12:34 PM   Result Value Ref Range    Extra Tube hold for add-on    Green Top (Gel)    Collection Time: 10/20/17 12:34 PM   Result Value Ref Range    Extra Tube Hold for add-ons.    Lavender Top    Collection Time: 10/20/17 12:34 PM   Result Value Ref Range    Extra Tube hold for add-on    Gold Top - SST    Collection Time: 10/20/17 12:34 PM   Result Value Ref Range    Extra Tube Hold for add-ons.    Comprehensive Metabolic Panel    Collection Time: 10/20/17 12:34 PM   Result Value Ref Range    Glucose 123 (H) 65 - 99 mg/dL    BUN 28 (H) 8 - 23 mg/dL    Creatinine 1.05 0.76 - 1.27 mg/dL    Sodium 141 136 - 145 mmol/L    Potassium 4.1 3.5 - 5.2 mmol/L    Chloride 98 98 - 107 mmol/L    CO2 29.0 22.0 - 29.0 mmol/L    Calcium 10.9 (H) 8.6 - 10.5 mg/dL    Total Protein 7.4 6.0 - 8.5 g/dL    Albumin 4.60 3.50 - 5.20 g/dL    ALT (SGPT) 21 1 - 41 U/L    AST (SGOT) 25 1 - 40 U/L    Alkaline Phosphatase 74 39 - 117 U/L    Total Bilirubin 0.6 0.1 - 1.2 mg/dL    eGFR Non African Amer 67 >60 mL/min/1.73    Globulin 2.8 gm/dL    A/G Ratio 1.6 g/dL    BUN/Creatinine Ratio 26.7 (H) 7.0 - 25.0    Anion Gap 14.0 mmol/L   Lipase    Collection Time: 10/20/17 12:34 PM   Result  Value Ref Range    Lipase 8 (L) 13 - 60 U/L   Troponin    Collection Time: 10/20/17 12:34 PM   Result Value Ref Range    Troponin T 0.015 0.000 - 0.030 ng/mL   CBC Auto Differential    Collection Time: 10/20/17 12:34 PM   Result Value Ref Range    WBC 6.28 4.50 - 10.70 10*3/mm3    RBC 3.84 (L) 4.60 - 6.00 10*6/mm3    Hemoglobin 11.0 (L) 13.7 - 17.6 g/dL    Hematocrit 35.5 (L) 40.4 - 52.2 %    MCV 92.4 79.8 - 96.2 fL    MCH 28.6 27.0 - 32.7 pg    MCHC 31.0 (L) 32.6 - 36.4 g/dL    RDW 13.1 11.5 - 14.5 %    RDW-SD 44.3 37.0 - 54.0 fl    MPV 10.1 6.0 - 12.0 fL    Platelets 287 140 - 500 10*3/mm3    Neutrophil % 78.3 (H) 42.7 - 76.0 %    Lymphocyte % 15.6 (L) 19.6 - 45.3 %    Monocyte % 4.8 (L) 5.0 - 12.0 %    Eosinophil % 0.3 0.3 - 6.2 %    Basophil % 0.5 0.0 - 1.5 %    Immature Grans % 0.5 0.0 - 0.5 %    Neutrophils, Absolute 4.92 1.90 - 8.10 10*3/mm3    Lymphocytes, Absolute 0.98 0.90 - 4.80 10*3/mm3    Monocytes, Absolute 0.30 0.20 - 1.20 10*3/mm3    Eosinophils, Absolute 0.02 0.00 - 0.70 10*3/mm3    Basophils, Absolute 0.03 0.00 - 0.20 10*3/mm3    Immature Grans, Absolute 0.03 0.00 - 0.03 10*3/mm3       I ordered the above labs and reviewed the results    RADIOLOGY  XR Chest 2 View   Final Result   At the left lung base laterally, there is a patchy opacity   suspicious for infiltrate though this could represent atelectasis.   Followup recommended.       This report was finalized on 10/20/2017 3:32 PM by Dr. Elmo Bernardo MD.          FL Esophagram Complete   Final Result      Patient swallowed thin barium without difficulty and a flow down the  esophagus without initial delay. Esophageal peristalsis appears normal  proximal two thirds. There is evidence of a fixed hiatus hernia. There  is a filling defect at the gastroesophageal junction consistent with  food particle. Additionally prominent filling defect is noted within the  inferior contrast column consistent with recent retained food. No  significant fluid  fluid is seen inferior to the possible blockage.  I ordered the above noted radiological studies and reviewed the images on the PACS system.     EKG    ekg was interpreted by Dr. Traore.        PROGRESS AND CONSULTS    1346  Reviewed pt's history and workup with Dr. Traore.  At bedside evaluation, they agree with the plan of care.    1440  Informed Pt's wife that his esophagram showed an obstruction in his esophagus and that we will develop a plan for further treatment.     1610  Discussed Pt's case with Dr. Ortiz who agrees to take patient to OR     1630  Pt taken to the OR. Final disposition will be determined by physicians.       DIAGNOSIS  Final diagnoses:   Esophageal obstruction       COURSE & MEDICAL DECISION MAKING  Pertinent Labs and Imaging studies that were ordered and reviewed are noted above.  Results were reviewed/discussed with the patient and they were also made aware of online assess.   Pt also made aware that some labs, such as cultures, will not be resulted during ER visit and follow up with PMD is necessary.     MEDICATIONS GIVEN IN ER  Medications   sodium chloride 0.9 % flush 10 mL ( Intravenous MAR Hold 10/20/17 1654)   sodium chloride 0.9 % flush 10 mL ( Intravenous MAR Hold 10/20/17 1654)   sodium chloride 0.9 % flush 1-10 mL (not administered)   lidocaine PF 1% (XYLOCAINE) injection 0.5 mL (not administered)   lactated ringers infusion ( Intravenous Anesthesia Volume Adjustment 10/20/17 1734)   fentaNYL citrate (PF) (SUBLIMAZE) injection 100 mcg (not administered)   midazolam (VERSED) injection 1 mg (1 mg Intravenous Given 10/20/17 1722)     Or   midazolam (VERSED) injection 2 mg ( Intravenous Not Given:  See Alt 10/20/17 1722)   glycopyrrolate (ROBINUL) injection 0.2 mg (0.2 mg Intravenous Given 10/20/17 1722)   metoprolol tartrate (LOPRESSOR) injection 5 mg (5 mg Intravenous Given 10/20/17 1537)   famotidine (PEPCID) injection 20 mg (20 mg Intravenous Given 10/20/17 1720)  "      BP (!) 199/93  Pulse 80  Temp 98.9 °F (37.2 °C)  Resp 17  Ht 66\" (167.6 cm)  Wt 140 lb (63.5 kg)  SpO2 96%  BMI 22.6 kg/m2    I personally reviewed the past medical history, past surgical history, social history, family history, current medications and allergies as they appear in this chart.  The scribe's note accurately reflects the work and decisions made by me.     Documentation assistance provided by rita Stack for YASIR Casper on 10/20/2017 at 12:53 PM. Information recorded by the scribe was done at my direction and has been verified and validated by me.         Rufina Stack  10/20/17 1625       Rufina Stack  10/20/17 1715       JOVANNY Herrera  10/20/17 1811    "

## 2017-10-20 NOTE — BRIEF OP NOTE
ESOPHAGOGASTRODUODENOSCOPY  Progress Note    Maximo Gonzalez  10/20/2017    Pre-op Diagnosis:   Esophageal obstruction due to food impaction       Post-Op Diagnosis Codes:     * Esophageal stricture [K22.2]     * Esophageal obstruction due to food impaction [K22.2, T18.128A]    Procedure/CPT® Codes:      Procedure(s):  ESOPHAGOGASTRODUODENOSCOPY FOOD BOLUS    Surgeon(s):  Dhiraj Ortiz MD    Anesthesia: GETA    Staff:   Circulator: Shannon Spurling, RN  Endo Technician: Kelly Cage RN  Endo Nurse: Patrick Auguste RN; Maribel Watson RN    Estimated Blood Loss: 10 mL    Urine Voided: * No values recorded between 10/20/2017  5:33 PM and 10/20/2017  6:43 PM *    Specimens:                None      Drains:           Findings: Chicken impacted into distal esophageal stricture  Bartium coated esophagus  Paced Overtube  Removed with Net/grasping forceps  Dilated stricture to 18mm    Complications: None      Dhiraj Ortiz MD     Date: 10/20/2017  Time: 6:53 PM

## 2017-10-20 NOTE — CONSULTS
Patient Care Team:  Larry Garza MD as PCP - General  Larry Garza MD as PCP - Family Medicine  Annalise Olmos MD as PCP - Claims Attributed    CHIEF COMPLAINT: Esophageal impaction    HISTORY OF PRESENT ILLNESS:    Pt is a 87 y.o. male who presents with pain in his epigastric region. He states that any time he swallows something, even small things, it is painful and works its way back up into his mouth. Pt states that this began yesterday night and that it is only painful when he eats or drinks. He vomited last night and that briefly resolves his symptoms.  Patient denies fever, chills, and SOA, .He denies choking on anything recently      Past Medical History:   Diagnosis Date   • Atherosclerotic heart disease of native coronary artery without angina pectoris     Normal LV systolic function.   • BPH (benign prostatic hypertrophy)    • Chronic pain syndrome    • Colon cancer    • Degeneration of cervical intervertebral disc     Cervical disk disease.   • Gastritis    • Hypertension    • Renal artery stenosis     renal artery disease   • Stroke    • Vascular disease      Past Surgical History:   Procedure Laterality Date   • ATHERECTOMY Right     superficial femoral artery   • CAROTID ENDARTERECTOMY      Right carotid endarterectomy. ReOP 8/11   • CERVICAL LAMINECTOMY     • CHOLECYSTECTOMY     • COLECTOMY PARTIAL / TOTAL      colon resection   • CORONARY ANGIOPLASTY WITH STENT PLACEMENT      Angioplasty and stent placement in LAD and evidence of restenosis w/ repeat angioplasty in April 2002.  Cath in 2005 showed no evidence of restenosis.   • LAPAROSCOPIC INGUINAL HERNIA REPAIR Left 02/19/2016    Dr. Neil Thomas   • OTHER SURGICAL HISTORY      Bypass Graft (non-vein) iliorenal   • TURP / TRANSURETHRAL INCISION / DRAINAGE PROSTATE       Family History   Problem Relation Age of Onset   • Cancer Mother      Breast   • No Known Problems Father      Social History   Substance Use Topics   • Smoking status:  "Former Smoker     Types: Cigarettes     Quit date: 1/27/1986   • Smokeless tobacco: Never Used   • Alcohol use No       (Not in a hospital admission)  Allergies:  Promethazine    REVIEW OF SYSTEMS:  Please see the above history of present illness for pertinent positives and negatives.  The remainder of the patient's systems have been reviewed and are negative.     Vital Signs  Temp:  [98.9 °F (37.2 °C)] 98.9 °F (37.2 °C)  Heart Rate:  [62-94] 62  Resp:  [15-19] 18  BP: (175-207)/(80-93) 175/80    Flowsheet Rows         First Filed Value    Admission Height  66\" (167.6 cm) Documented at 10/20/2017 1148    Admission Weight  140 lb (63.5 kg) Documented at 10/20/2017 1148           Physical Exam:  Physical Exam   Constitutional: Patient appears well-developed and well-nourished and in no acute distress   HEENT:   Head: Normocephalic and atraumatic.   Eyes:  Pupils are equal, round, and reactive to light. EOM are intact. Sclera are anicteric and non-injected.  Mouth and Throat: Patient has moist mucous membranes. Oropharynx is clear of any erythema or exudate.     Neck: Neck supple. No JVD present. No thyromegaly present. No lymphadenopathy present.  Cardiovascular: Regular rate, regular rhythm, S1 normal and S2 normal.  Exam reveals no gallop and no friction rub.  No murmur heard.  Pulmonary/Chest: Lungs are clear to auscultation bilaterally. No respiratory distress. No wheezes. No rhonchi. No rales.   Abdominal: Soft. Bowel sounds are normal. No distension and no mass. There is no hepatosplenomegaly. There is no tenderness.   Musculoskeletal: Normal Muscle tone  Extremities: No edema. Pulses are palpable in all 4 extremities.  Neurological: Patient is alert and oriented to person, place, and time. Cranial nerves II-XII are grossly intact with no focal deficits.  Skin: Skin is warm. No rash noted. Nails show no clubbing.  No cyanosis or erythema.     Results Review:    I reviewed the patient's new clinical results.  Lab " Results (most recent)     Procedure Component Value Units Date/Time    Lavender Top [[id number]] Collected:  10/20/17 1234    Specimen:  Blood Updated:  10/20/17 1346     Extra Tube hold for add-on      Auto resulted       Gold Top - SST [[id number]] Collected:  10/20/17 1234    Specimen:  Blood Updated:  10/20/17 1346     Extra Tube Hold for add-ons.      Auto resulted.       [place] Draw [[id number]] Collected:  10/20/17 1234    Specimen:  Blood Updated:  10/20/17 1346    Narrative:       The following orders were created for panel order [place] Draw.  Procedure                               Abnormality         Status                     ---------                               -----------         ------                     Light Blue Top[[id number]]                                   Final result               Green Top (Gel)[[id number]]                                  Final result               Lavender Top[[id number]]                                     Final result               Gold Top - SST[[id number]]                                   Final result                 Please view results for these tests on the individual orders.    Light Blue Top [[id number]] Collected:  10/20/17 1234    Specimen:  Blood Updated:  10/20/17 1346     Extra Tube hold for add-on      Auto resulted       Green Top (Gel) [[id number]] Collected:  10/20/17 1234    Specimen:  Blood Updated:  10/20/17 1346     Extra Tube Hold for add-ons.      Auto resulted.       CBC & Differential [[id number]] Collected:  10/20/17 1234    Specimen:  Blood Updated:  10/20/17 1349    Narrative:       The following orders were created for panel order CBC & Differential.  Procedure                               Abnormality         Status                     ---------                               -----------         ------                     CBC Auto Differential[[id number]]        Abnormal            Final result                 Please view results for these tests  on the individual orders.    CBC Auto Differential [[id number]]  (Abnormal) Collected:  10/20/17 1234    Specimen:  Blood Updated:  10/20/17 1349     WBC 6.28 10*3/mm3      RBC 3.84 (L) 10*6/mm3      Hemoglobin 11.0 (L) g/dL      Hematocrit 35.5 (L) %      MCV 92.4 fL      MCH 28.6 pg      MCHC 31.0 (L) g/dL      RDW 13.1 %      RDW-SD 44.3 fl      MPV 10.1 fL      Platelets 287 10*3/mm3      Neutrophil % 78.3 (H) %      Lymphocyte % 15.6 (L) %      Monocyte % 4.8 (L) %      Eosinophil % 0.3 %      Basophil % 0.5 %      Immature Grans % 0.5 %      Neutrophils, Absolute 4.92 10*3/mm3      Lymphocytes, Absolute 0.98 10*3/mm3      Monocytes, Absolute 0.30 10*3/mm3      Eosinophils, Absolute 0.02 10*3/mm3      Basophils, Absolute 0.03 10*3/mm3      Immature Grans, Absolute 0.03 10*3/mm3     Lipase [[id number]]  (Abnormal) Collected:  10/20/17 1234    Specimen:  Blood Updated:  10/20/17 1354     Lipase 8 (L) U/L     Troponin [[id number]]  (Normal) Collected:  10/20/17 1234    Specimen:  Blood Updated:  10/20/17 1354     Troponin T 0.015 ng/mL     Narrative:       Troponin T Reference Ranges:  Less than 0.03 ng/mL:    Negative for AMI  0.03 to 0.09 ng/mL:      Indeterminant for AMI  Greater than 0.09 ng/mL: Positive for AMI    Comprehensive Metabolic Panel [[id number]]  (Abnormal) Collected:  10/20/17 1234    Specimen:  Blood Updated:  10/20/17 1358     Glucose 123 (H) mg/dL      BUN 28 (H) mg/dL      Creatinine 1.05 mg/dL      Sodium 141 mmol/L      Potassium 4.1 mmol/L      Chloride 98 mmol/L      CO2 29.0 mmol/L      Calcium 10.9 (H) mg/dL      Total Protein 7.4 g/dL      Albumin 4.60 g/dL      ALT (SGPT) 21 U/L      AST (SGOT) 25 U/L      Alkaline Phosphatase 74 U/L      Total Bilirubin 0.6 mg/dL      eGFR Non African Amer 67 mL/min/1.73      Globulin 2.8 gm/dL      A/G Ratio 1.6 g/dL      BUN/Creatinine Ratio 26.7 (H)     Anion Gap 14.0 mmol/L     Narrative:       The MDRD GFR formula is only valid for adults with stable  "renal function between ages 18 and 70.          Imaging Results (most recent)     Procedure Component Value Units Date/Time    FL Esophagram Complete [[id number]] Collected:  10/20/17 1506     Updated:  10/20/17 1518    Narrative:       BARIUM SWALLOW ESOPHAGRAM WITH  FILM OF THE CHEST PROCEDURE  DICTATED SEPARATELY)     HISTORY: 87-year-old male with previous cholecystectomy, colon cancer 5  years ago and history of bilateral inguinal hernia repair presents with  history of \"unable to hold anything down starting yesterday\"     COMPARISON: None available     FINDINGS:  1.  film has been dictated.     Patient swallowed thin barium without difficulty and a flow down the  esophagus without initial delay. Esophageal peristalsis appears normal  proximal two thirds. There is evidence of a fixed hiatus hernia. There  is a filling defect at the gastroesophageal junction consistent with  food particle. Additionally prominent filling defect is noted within the  inferior contrast column consistent with recent retained food. No  significant fluid fluid is seen inferior to the possible blockage.     FLUOROSCOPY TIME: 38 seconds, 20 images     Results have been discussed with Dr. Traore of the ER by Dr. Monte  per telephone.     This report was finalized on 10/20/2017 3:15 PM by Dr. Dani Monte MD.       XR Chest 2 View [[id number]] Collected:  10/20/17 1505     Updated:  10/20/17 1535    Narrative:       CHEST: AP, LATERAL.     HISTORY: Epigastric pain. Unable to hold anything down since yesterday.     COMPARISON: AP chest 09/11/2011.     FINDINGS: There has developed an area of faint airspace disease within  the left lower lobe that is suspicious for infiltrate. On the lateral  view, this faint opacity superimposes the inferior lingula and lower  lobes. The lungs are hyperexpanded and there is flattening of the  hemidiaphragms. There is no evidence for pulmonary edema or pleural  effusion. Surgical clips overlie " the gastroesophageal junction. There is  apical lordotic positioning.       Impression:       At the left lung base laterally, there is a patchy opacity  suspicious for infiltrate though this could represent atelectasis.  Followup recommended.     This report was finalized on 10/20/2017 3:32 PM by Dr. Elmo Bernardo MD.           reviewed    ECG/EMG Results (most recent)     Procedure Component Value Units Date/Time    ECG 12 Lead [[id number]] Collected:  10/20/17 1152     Updated:  10/20/17 1158        reviewed    Assessment/Plan     ESOPHAGEAL FOOD IMPACTION    Emergent EGD in OR    I discussed the patients findings and my recommendations with patient.     Dhiraj Ortiz MD  10/20/17  4:31 PM    Time: 10 min prior to procedure.

## 2017-10-20 NOTE — DISCHARGE INSTRUCTIONS
Outpatient Surgery Guidelines, Adult  Outpatient procedures are those for which the person having the procedure is allowed to go home the same day as the procedure. Various procedures are done on an outpatient basis. You should follow some general guidelines if you will be having an outpatient procedure.  AFTER THE  PROCEDURE  After surgery, you will be taken to a recovery area, where your progress will be monitored. If there are no complications, you will be allowed to go home when you are awake, stable, and taking fluids well. You may have numbness around the surgical site. Healing will take some time. You will have tenderness at the surgical site and may have some swelling and bruising. You may also have some nausea.  HOME CARE INSTRUCTIONS  · Do not drive for 24 hours, or as directed by your health care provider. Do not drive while taking prescription pain medicines.  · Do not drink alcohol for 24 hours.  · Do not make important decisions or sign legal documents for 24 hours.  · Plan on having a responsible adult stay with your for 24 hours following your procedure.  · You may resume a normal diet and activities as directed.  · Only take over-the-counter or prescription medicines as directed by your health care provider.  · Follow up with your health care provider as directed.  SEEK MEDICAL CARE IF:  · You have increased bleeding (more than a small spot) from the surgical site.  · You have redness, swelling, or increasing pain in the wound.  · You see pus coming from the wound.  · You have a fever > 101.  · You notice a bad smell coming from the wound or dressing.  · You feel lightheaded or faint.  · You develop a rash.  · You have trouble breathing.  · You develop allergies.  MAKE SURE YOU:  · Understand these instructions.  · Will watch your condition.  · Will get help right away if you are not doing well or get worse.

## 2017-10-20 NOTE — PROGRESS NOTES
Clinical Pharmacy Services: Medication History    Agree with medication history and documentation as performed by Mervat Beck, Medication History Technician.    Rosemarie Lennon, PharmD, [facility]  Clinical Pharmacy Specialist, Emergency Medicine  Work  Phone: [phone]  _________________________________________________________________________________________________________________    Clinical Pharmacy Services: Medication History    Maximo Gonzalez is a 87 y.o. male presenting to [facility] for chest pain.    He  has a past medical history of Atherosclerotic heart disease of native coronary artery without angina pectoris; BPH (benign prostatic hypertrophy); Chronic pain syndrome; Colon cancer; Degeneration of cervical intervertebral disc; Gastritis; Hypertension; Renal artery stenosis; Stroke; and Vascular disease.    Allergies as of 10/20/2017 - Praveen as Reviewed 10/20/2017   Allergen Reaction Noted   • Promethazine Hallucinations 10/20/2017       Medication information was obtained from: Patient, daughter, pharmacy  Pharmacy and Phone Number: [facility] [phone]    Prior to Admission Medications     Prescriptions Last Dose Informant Patient Reported? Taking?    aspirin 81 MG tablet  Child Yes No    Take 1 tablet by mouth daily.    benazepril-hydrochlorthiazide (LOTENSIN HCT) 20-12.5 MG per tablet  Child Yes Yes    Take 1 tablet by mouth Daily.    CloNIDine (CATAPRES) 0.1 MG tablet  Child Yes Yes    Take 0.1 mg by mouth 2 (Two) Times a Day As Needed for High Blood Pressure.    furosemide (LASIX) 40 MG tablet  Child No No    Take 1 tablet by mouth Daily.    Patient taking differently:  Take 40 mg by mouth Daily As Needed (for swollen ankles).    hydrALAZINE (APRESOLINE) 50 MG tablet  Child Yes Yes    Take 50 mg by mouth 2 (Two) Times a Day. Patient states he only takes 1 tablet at night    labetalol (NORMODYNE) 200 MG tablet  Child No No    Take 1 tablet by mouth 2 (Two) Times a Day. Indications:  High Blood Pressure, Generic version please    Patient taking differently:  Take 200 mg by mouth 2 (Two) Times a Day. Patient states he is taking 1 tablet at night  Indications: High Blood Pressure Disorder, Generic version please    oxyCODONE-acetaminophen (PERCOCET) 5-325 MG per tablet  Child Yes No    Take 1 tablet by mouth every 4 (four) hours as needed. For pain    promethazine (PHENERGAN) 25 MG tablet  Child Yes Yes    Take 25 mg by mouth 2 (Two) Times a Day As Needed for Nausea or Vomiting.            Medication notes: Removed:  Trazodone-patient and daughter said he doesn't take this any longer.  Hydralazine and Labetalol are dosed BID, but patient says he is only taking one tablet at night.    This medication list is complete to the best of my knowledge as of 10/20/2017    Please call if questions.    Mervat Beck, Medication History Technician  10/20/2017 4:10 PM

## 2017-10-20 NOTE — ED NOTES
"Pt's family member at bedside reports pt has been c/o epigastric pain for several days. Pt has been \"spitting up everything he's trying to take down.\" Pt's family member reports pt spit up \"even his clonidine, as tiny as it was.\" Pt states he is not nauseous, but things \"just keep coming up.\" Family member reports these don't appearing to be episodes of vomiting. Pt does report his pain increases when he tries to eat or drink. Pt reports upper epigastric pain that is \"deep,\" 8/10 on pain scale. APRN notified.     Chelle Miller RN  10/20/17 4638    "

## 2017-10-20 NOTE — ANESTHESIA POSTPROCEDURE EVALUATION
Patient: Maximo Gonzalez    Procedure Summary     Date Anesthesia Start Anesthesia Stop Room / Location    10/20/17 0262 [id number]  [facility] / [facility]       Procedure Diagnosis Surgeon Provider    ESOPHAGOGASTRODUODENOSCOPY FOOD BOLUS (N/A Esophagus) Esophageal stricture; Esophageal obstruction due to food impaction MD Jurgen Fitzgerald MD          Anesthesia Type: general  Last vitals  BP   (!) 182/85 (10/20/17 1915)   Temp   36.6 °C (97.9 °F) (10/20/17 1900)   Pulse   71 (10/20/17 1915)   Resp   16 (10/20/17 1915)     SpO2   97 % (10/20/17 1915)     Post Anesthesia Care and Evaluation    Patient location during evaluation: PACU  Patient participation: complete - patient participated  Level of consciousness: awake and alert  Pain management: adequate  Airway patency: patent  Anesthetic complications: No anesthetic complications    Cardiovascular status: acceptable  Respiratory status: acceptable  Hydration status: acceptable    Comments: --------------------            10/20/17               1915     --------------------   BP:     (!) 182/85   Pulse:      71       Resp:       16       Temp:                SpO2:      97%      --------------------

## 2017-10-20 NOTE — ANESTHESIA PROCEDURE NOTES
Airway  Urgency: elective    Airway not difficult    General Information and Staff    Patient location during procedure: OR  Anesthesiologist: YORDAN NAM    Indications and Patient Condition  Indications for airway management: airway protection    Preoxygenated: yes  Mask difficulty assessment: 1 - vent by mask    Final Airway Details  Final airway type: endotracheal airway      Successful airway: ETT  Cuffed: yes   Successful intubation technique: direct laryngoscopy and video laryngoscopy  Endotracheal tube insertion site: oral  Blade: Lindsey  Blade size: #3  ETT size: 7.0 mm  Placement verified by: chest auscultation and capnometry   Measured from: teeth  ETT to teeth (cm): 19  Number of attempts at approach: 1    Additional Comments  Pre oxygenated  Easy mask vent  Atraumatic intubation  + etco2  Breath sounds equal bilaterally  Neck remained in neutral stable position, easy intubation

## 2017-10-20 NOTE — ED PROVIDER NOTES
Pt presents to the ED c/o NVD and difficulty swallowing. On exam, HENT WNL, RRR, CTAB, abdomen soft nontender. Will obtain an esophogram.     EKG           EKG time: 1152  Rhythm/Rate: NSR 80  P waves and ID: nml  QRS, axis: nml, LVH   ST and T waves: non specific changes      Interpreted Contemporaneously by me, independently viewed  unchanged compared to prior 4/20/17      Attestation:  I supervised care provided by the midlevel provider.    We have discussed this patient's history, physical exam, and treatment plan.   I have reviewed the note and personally saw and examined the patient and agree with the plan of care.    Documentation assistance provided by rita Logan for Dr. Traore Information recorded by the rita was done at my direction and has been verified and validated by me.       Kasandra Logan  10/20/17 3888       Kasandra Logan  10/20/17 1412       Arnold Traore MD  10/20/17 1414

## 2017-10-20 NOTE — ANESTHESIA PREPROCEDURE EVALUATION
Anesthesia Evaluation     Patient summary reviewed and Nursing notes reviewed   NPO Solid Status: > 8 hours       Airway   Mallampati: II  TM distance: >3 FB  Neck ROM: limited  possible difficult intubation  Dental - normal exam   (+) upper dentures and lower dentures    Pulmonary - negative pulmonary ROS and normal exam   Cardiovascular - normal exam    (+) hypertension, CAD, PVD,       Neuro/Psych  (+) CVA,    GI/Hepatic/Renal/Endo - negative ROS     Musculoskeletal (-) negative ROS    Abdominal  - normal exam   Substance History - negative use     OB/GYN negative ob/gyn ROS         Other        ROS/Med Hx Other: c fusion 20 yr ago  Wears c collar for comfort                                  Anesthesia Plan    ASA 4     general     intravenous induction   Anesthetic plan and risks discussed with patient.

## 2017-10-20 NOTE — PERIOPERATIVE NURSING NOTE
Patient if very hard of hearing. 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  Uses hearing aide on right.  Daughter Mary has all belongings in waiting room.

## 2017-10-24 ENCOUNTER — EPISODE CHANGES (OUTPATIENT)
Dept: CASE MANAGEMENT | Facility: OTHER | Age: 82
End: 2017-10-24

## 2017-10-27 ENCOUNTER — EPISODE CHANGES (OUTPATIENT)
Dept: CASE MANAGEMENT | Facility: OTHER | Age: 82
End: 2017-10-27

## 2017-10-30 RX ORDER — HYDRALAZINE HYDROCHLORIDE 50 MG/1
50 TABLET, FILM COATED ORAL 2 TIMES DAILY
Qty: 180 TABLET | Refills: 2 | Status: SHIPPED | OUTPATIENT
Start: 2017-10-30 | End: 2017-10-31 | Stop reason: SDUPTHER

## 2017-10-30 RX ORDER — LABETALOL 200 MG/1
200 TABLET, FILM COATED ORAL 2 TIMES DAILY
Qty: 180 TABLET | Refills: 2 | Status: SHIPPED | OUTPATIENT
Start: 2017-10-30 | End: 2018-11-20 | Stop reason: SDUPTHER

## 2017-10-31 RX ORDER — HYDRALAZINE HYDROCHLORIDE 50 MG/1
50 TABLET, FILM COATED ORAL NIGHTLY
Qty: 90 TABLET | Refills: 2 | Status: SHIPPED | OUTPATIENT
Start: 2017-10-31 | End: 2018-07-31 | Stop reason: SDUPTHER

## 2017-11-16 ENCOUNTER — OFFICE (OUTPATIENT)
Dept: URBAN - METROPOLITAN AREA OTHER 6 | Facility: OTHER | Age: 82
End: 2017-11-16

## 2017-11-16 VITALS
DIASTOLIC BLOOD PRESSURE: 86 MMHG | WEIGHT: 140 LBS | HEART RATE: 70 BPM | HEIGHT: 66 IN | SYSTOLIC BLOOD PRESSURE: 178 MMHG

## 2017-11-16 DIAGNOSIS — K22.2 ESOPHAGEAL OBSTRUCTION: ICD-10-CM

## 2017-11-16 DIAGNOSIS — Z85.038 PERSONAL HISTORY OF OTHER MALIGNANT NEOPLASM OF LARGE INTEST: ICD-10-CM

## 2017-11-16 PROCEDURE — 99213 OFFICE O/P EST LOW 20 MIN: CPT

## 2017-11-27 RX ORDER — HYDRALAZINE HYDROCHLORIDE 50 MG/1
TABLET, FILM COATED ORAL
Qty: 60 TABLET | Refills: 0 | Status: SHIPPED | OUTPATIENT
Start: 2017-11-27 | End: 2018-01-24 | Stop reason: SDUPTHER

## 2018-01-25 RX ORDER — HYDRALAZINE HYDROCHLORIDE 50 MG/1
TABLET, FILM COATED ORAL
Qty: 60 TABLET | Refills: 0 | Status: SHIPPED | OUTPATIENT
Start: 2018-01-25 | End: 2018-02-24 | Stop reason: SDUPTHER

## 2018-01-26 RX ORDER — PROMETHAZINE HYDROCHLORIDE 25 MG/1
TABLET ORAL
Qty: 60 TABLET | Refills: 0 | Status: SHIPPED | OUTPATIENT
Start: 2018-01-26 | End: 2018-05-24 | Stop reason: SDUPTHER

## 2018-01-26 RX ORDER — CLONIDINE HYDROCHLORIDE 0.1 MG/1
TABLET ORAL
Qty: 60 TABLET | Refills: 0 | Status: SHIPPED | OUTPATIENT
Start: 2018-01-26 | End: 2018-05-24 | Stop reason: SDUPTHER

## 2018-02-26 RX ORDER — HYDRALAZINE HYDROCHLORIDE 50 MG/1
TABLET, FILM COATED ORAL
Qty: 60 TABLET | Refills: 0 | Status: SHIPPED | OUTPATIENT
Start: 2018-02-26 | End: 2018-03-26 | Stop reason: SDUPTHER

## 2018-02-26 RX ORDER — BENAZEPRIL HYDROCHLORIDE AND HYDROCHLOROTHIAZIDE 20; 12.5 MG/1; MG/1
TABLET ORAL
Qty: 90 TABLET | Refills: 3 | Status: SHIPPED | OUTPATIENT
Start: 2018-02-26 | End: 2018-05-24 | Stop reason: SDUPTHER

## 2018-03-26 RX ORDER — HYDRALAZINE HYDROCHLORIDE 50 MG/1
TABLET, FILM COATED ORAL
Qty: 60 TABLET | Refills: 1 | Status: SHIPPED | OUTPATIENT
Start: 2018-03-26 | End: 2018-05-24 | Stop reason: SDUPTHER

## 2018-05-08 RX ORDER — PROMETHAZINE HYDROCHLORIDE 25 MG/1
TABLET ORAL
Qty: 60 TABLET | Refills: 0 | Status: SHIPPED | OUTPATIENT
Start: 2018-05-08 | End: 2018-05-24 | Stop reason: SDUPTHER

## 2018-05-21 RX ORDER — FUROSEMIDE 40 MG/1
TABLET ORAL
Qty: 30 TABLET | Refills: 1 | Status: SHIPPED | OUTPATIENT
Start: 2018-05-21 | End: 2019-02-07 | Stop reason: SDUPTHER

## 2018-05-24 ENCOUNTER — OFFICE VISIT (OUTPATIENT)
Dept: CARDIOLOGY | Facility: CLINIC | Age: 83
End: 2018-05-24

## 2018-05-24 VITALS
HEIGHT: 66 IN | SYSTOLIC BLOOD PRESSURE: 142 MMHG | DIASTOLIC BLOOD PRESSURE: 82 MMHG | BODY MASS INDEX: 22.66 KG/M2 | WEIGHT: 141 LBS | HEART RATE: 61 BPM

## 2018-05-24 DIAGNOSIS — I25.10 CORONARY ARTERY DISEASE INVOLVING NATIVE CORONARY ARTERY OF NATIVE HEART WITHOUT ANGINA PECTORIS: Primary | ICD-10-CM

## 2018-05-24 DIAGNOSIS — I73.9 PERIPHERAL VASCULAR DISEASE (HCC): ICD-10-CM

## 2018-05-24 DIAGNOSIS — I10 ESSENTIAL HYPERTENSION: ICD-10-CM

## 2018-05-24 DIAGNOSIS — E78.2 MIXED HYPERLIPIDEMIA: ICD-10-CM

## 2018-05-24 PROCEDURE — 99214 OFFICE O/P EST MOD 30 MIN: CPT | Performed by: INTERNAL MEDICINE

## 2018-05-24 PROCEDURE — 93000 ELECTROCARDIOGRAM COMPLETE: CPT | Performed by: INTERNAL MEDICINE

## 2018-05-24 NOTE — PROGRESS NOTES
Date of Office Visit: 18  Encounter Provider: Jaxon Shaw MD  Place of Service: [facility] CARDIOLOGY  Patient Name: Maximo Gonzalez  :3/9/1930  Referral Provider:No ref. provider found      Chief Complaint   Patient presents with   • Coronary Artery Disease     History of Present Illness   Mr. Gonzalez is a pleasant 88-year-old gentleman with a history of coronary artery disease. The patient had positive stress testing. Cardiac catheterization showed severe disease of the left anterior descending. He  had angioplasty and stent placement in two areas of the LAD in 2001. He then had a positive perfusion study with evidence of restenosis, repeat angioplasty of the LAD back in 2002. Then in 2005, he  had increasing atypical symptoms, hypertension and repeat catheterization showed no real significant coronary disease normal LV function and normal renal arteriogram. In 2006, he was having claudication and he was  found to have right superficial femoral stenosis. He had atherectomy of that. He was than in the hospital for repeat right carotid stenosis with repeat right carotid endarterectomy. He comes in for followup .The patient denies chest pain, pressure and heaviness. He has some shortness of breath especially going up steps or rushing and can't do as much as he used to, no othopnea or PND. No palpitations, near syncope or syncope. No stroke type symptoms like paralysis, paresthesia, abrupt vision loss and dysarthria. No bleeding like blood in the stool or dark stools.  He's not doing as much walking because of his legs and knees just harder for him to get up and move.  Overall he feels like he's doing reasonably well.  He lives in a condo kilts a lot of help from his daughter.      Coronary Artery Disease   Pertinent negatives include no dizziness, muscle weakness or weight gain. There is no history of past myocardial infarction.         Past  Medical History:   Diagnosis Date   • Atherosclerotic heart disease of native coronary artery without angina pectoris     Normal LV systolic function.   • BPH (benign prostatic hypertrophy)    • Chronic pain syndrome    • Colon cancer    • Degeneration of cervical intervertebral disc     Cervical disk disease.   • Gastritis    • Hypertension    • Renal artery stenosis     renal artery disease   • Stroke    • Vascular disease          Past Surgical History:   Procedure Laterality Date   • ATHERECTOMY Right     superficial femoral artery   • CAROTID ENDARTERECTOMY      Right carotid endarterectomy. ReOP 8/11   • CERVICAL LAMINECTOMY     • CHOLECYSTECTOMY     • COLECTOMY PARTIAL / TOTAL      colon resection   • CORONARY ANGIOPLASTY WITH STENT PLACEMENT      Angioplasty and stent placement in LAD and evidence of restenosis w/ repeat angioplasty in April 2002.  Cath in 2005 showed no evidence of restenosis.   • ENDOSCOPY N/A 10/20/2017    Procedure: ESOPHAGOGASTRODUODENOSCOPY FOOD BOLUS;  Surgeon: Dhiraj Ortiz MD;  Location: [facility];  Service:    • LAPAROSCOPIC INGUINAL HERNIA REPAIR Left 02/19/2016    Dr. Neil Thomas   • OTHER SURGICAL HISTORY      Bypass Graft (non-vein) iliorenal   • PAIN PUMP INSERTION/REVISION Left 2015    dilaudid    • TURP / TRANSURETHRAL INCISION / DRAINAGE PROSTATE           Current Outpatient Prescriptions on File Prior to Visit   Medication Sig Dispense Refill   • aspirin 81 MG tablet Take 1 tablet by mouth daily.     • benazepril-hydrochlorthiazide (LOTENSIN HCT) 20-12.5 MG per tablet Take 1 tablet by mouth Daily.     • CloNIDine (CATAPRES) 0.1 MG tablet Take 0.1 mg by mouth 2 (Two) Times a Day As Needed for High Blood Pressure.     • furosemide (LASIX) 40 MG tablet TAKE ONE TABLET BY MOUTH ONCE DAILY 30 tablet 1   • hydrALAZINE (APRESOLINE) 50 MG tablet Take 1 tablet by mouth Every Night. Patient states he only takes 1 tablet at night 90 tablet 2   • labetalol  (NORMODYNE) 200 MG tablet Take 1 tablet by mouth 2 (Two) Times a Day. Indications: High Blood Pressure Disorder, Generic version please (Patient taking differently: Take 200 mg by mouth 1 (One) Time.) 180 tablet 2   • omeprazole (PRILOSEC) 40 MG capsule Take 1 capsule by mouth Daily. 90 capsule 3   • oxyCODONE-acetaminophen (PERCOCET) 5-325 MG per tablet Take 1 tablet by mouth every 4 (four) hours as needed. For pain     • promethazine (PHENERGAN) 25 MG tablet Take 25 mg by mouth 2 (Two) Times a Day As Needed for Nausea or Vomiting.     • [DISCONTINUED] benazepril-hydrochlorthiazide (LOTENSIN HCT) 20-12.5 MG per tablet TAKE ONE TABLET BY MOUTH ONCE DAILY 90 tablet 3   • [DISCONTINUED] CloNIDine (CATAPRES) 0.1 MG tablet TAKE ONE TABLET BY MOUTH TWICE DAILY AS NEEDED FOR VERY HIGH BLOOD PRESSURE 60 tablet 0   • [DISCONTINUED] hydrALAZINE (APRESOLINE) 50 MG tablet TAKE 1 TABLET BY MOUTH TWICE DAILY 60 tablet 1   • [DISCONTINUED] promethazine (PHENERGAN) 25 MG tablet TAKE ONE TABLET BY MOUTH TWICE DAILY AS NEEDED FOR NAUSEA AND VOMITING 60 tablet 0   • [DISCONTINUED] promethazine (PHENERGAN) 25 MG tablet TAKE ONE TABLET BY MOUTH TWICE DAILY AS NEEDED FOR NAUSEA AND VOMITING 60 tablet 0     No current facility-administered medications on file prior to visit.          Social History     Social History   • Marital status:      Spouse name: N/A   • Number of children: N/A   • Years of education: N/A     Occupational History   • Not on file.     Social History Main Topics   • Smoking status: Former Smoker     Types: Cigarettes     Quit date: 1/27/1986   • Smokeless tobacco: Never Used   • Alcohol use No   • Drug use: No   • Sexual activity: Defer     Other Topics Concern   • Not on file     Social History Narrative   • No narrative on file       Family History   Problem Relation Age of Onset   • Cancer Mother         Breast   • No Known Problems Father          Review of Systems   Constitution: Negative for decreased  "appetite, diaphoresis, fever, weakness, malaise/fatigue, weight gain and weight loss.   HENT: Positive for hearing loss. Negative for congestion, nosebleeds and tinnitus.    Eyes: Positive for vision loss in left eye and vision loss in right eye. Negative for blurred vision, double vision and visual disturbance.   Cardiovascular:        As noted in HPI   Respiratory:        As noted HPI   Endocrine: Negative for cold intolerance and heat intolerance.   Hematologic/Lymphatic: Negative for bleeding problem. Does not bruise/bleed easily.   Skin: Negative for color change, flushing, itching and rash.   Musculoskeletal: Positive for joint pain. Negative for arthritis, back pain, joint swelling, muscle weakness and myalgias.   Gastrointestinal: Negative for bloating, abdominal pain, constipation, diarrhea, dysphagia, heartburn, hematemesis, hematochezia, melena, nausea and vomiting.   Genitourinary: Negative for bladder incontinence, dysuria, frequency, nocturia and urgency.   Neurological: Negative for dizziness, focal weakness, headaches, light-headedness, loss of balance, numbness, paresthesias and vertigo.   Psychiatric/Behavioral: Negative for depression, memory loss and substance abuse.       Procedures      ECG 12 Lead  Date/Time: 5/24/2018 1:17 PM  Performed by: RONY FRANKLIN  Authorized by: RONY FRANKLIN   Comparison: compared with previous ECG   Similar to previous ECG  Rhythm: sinus rhythm  Rate: normal  Conduction: 1st degree  QRS axis: normal                  Objective:    /82 (BP Location: Left arm)   Pulse 61   Ht 167.6 cm (66\")   Wt 64 kg (141 lb)   BMI 22.76 kg/m²        Physical Exam  Physical Exam   Constitutional: He is oriented to person, place, and time. He appears well-developed and well-nourished. No distress.   HENT:   Head: Normocephalic.   Eyes: Conjunctivae are normal. Pupils are equal, round, and reactive to light. No scleral icterus.   Neck: Normal carotid pulses, no " hepatojugular reflux and no JVD present. Carotid bruit is not present. No tracheal deviation, no edema and no erythema present. No thyromegaly present.   Cardiovascular: Normal rate, regular rhythm, S1 normal, S2 normal and intact distal pulses.   No extrasystoles are present. PMI is not displaced.  Exam reveals no gallop, no distant heart sounds and no friction rub.    Murmur heard.   Systolic murmur is present with a grade of 2/6  at the lower left sternal border  Pulses:       Carotid pulses are 2+ on the right side, and 2+ on the left side.       Radial pulses are 2+ on the right side, and 2+ on the left side.        Femoral pulses are 2+ on the right side, and 2+ on the left side.       Dorsalis pedis pulses are 2+ on the right side, and 2+ on the left side.        Posterior tibial pulses are 2+ on the right side, and 2+ on the left side.   Pulmonary/Chest: Effort normal and breath sounds normal. No respiratory distress. He has no decreased breath sounds. He has no wheezes. He has no rhonchi. He has no rales. He exhibits no tenderness.   Abdominal: Soft. Bowel sounds are normal. He exhibits no distension and no mass. There is no hepatosplenomegaly. There is no tenderness. There is no rebound and no guarding.   Musculoskeletal: He exhibits edema (1+ bilateral tibial). He exhibits no tenderness or deformity.   Neurological: He is alert and oriented to person, place, and time.   Skin: Skin is warm and dry. No rash noted. He is not diaphoretic. No cyanosis or erythema. No pallor. Nails show no clubbing.   Psychiatric: He has a normal mood and affect. His speech is normal and behavior is normal. Judgment and thought content normal.           Assessment:   1. This an 88-year-old gentleman with history of severe coronary disease, preserved left ventricular systolic function, and previous angioplasty and  stent placement of the left anterior descending. He then had evidence of restenosis with repeat angioplasty in  April 2002. Catheterization in 2005  showed no evidence of restenosis. He had a PET perfusion study done in March 2011 that showed no evidence of ischemia and preserved left ventricular  function.   Coronary Artery Disease  Assessment  • The patient has no angina    Plan  • Lifestyle modifications discussed include adhering to a heart healthy diet, avoidance of tobacco products, maintenance of a healthy weight, medication compliance, regular exercise and regular monitoring of cholesterol and blood pressure    Subjective - Objective  • There has been a previous stent procedure using OCTAVIA  • Current antiplatelet therapy includes aspirin 81 mg    He is now doing well. At this time, we will continue the same and see him again in follow-up in about a year.     2. Peripheral vascular disease status post atherectomy of the right superficial femoral artery. He is to follow up with vascular surgery.  3. Cerebrovascular disease status post right carotid endarterectomy.Restenosis with repeat right carotid endarterectomy in August 2011. He follows with vascular surgery.  4. History of renal artery stenosis status post renal artery bypass with no evidence of restenosis.  5. History of colon cancer status post colon resection.  6. Hyperlipidemia.  Apparently not on statin therapy due to his age.  7.Hypertension.   BP under good control..          Plan:

## 2018-07-31 RX ORDER — HYDRALAZINE HYDROCHLORIDE 50 MG/1
50 TABLET, FILM COATED ORAL NIGHTLY
Qty: 90 TABLET | Refills: 0 | Status: SHIPPED | OUTPATIENT
Start: 2018-07-31 | End: 2018-11-20 | Stop reason: SDUPTHER

## 2018-10-16 RX ORDER — CLONIDINE HYDROCHLORIDE 0.1 MG/1
0.1 TABLET ORAL 2 TIMES DAILY PRN
Qty: 60 TABLET | Refills: 0 | OUTPATIENT
Start: 2018-10-16

## 2018-11-20 ENCOUNTER — OFFICE VISIT (OUTPATIENT)
Dept: FAMILY MEDICINE CLINIC | Facility: CLINIC | Age: 83
End: 2018-11-20

## 2018-11-20 VITALS
WEIGHT: 144 LBS | BODY MASS INDEX: 23.14 KG/M2 | SYSTOLIC BLOOD PRESSURE: 196 MMHG | DIASTOLIC BLOOD PRESSURE: 64 MMHG | HEIGHT: 66 IN | HEART RATE: 64 BPM | OXYGEN SATURATION: 93 %

## 2018-11-20 DIAGNOSIS — M79.604 PAIN IN BOTH LOWER EXTREMITIES: ICD-10-CM

## 2018-11-20 DIAGNOSIS — M79.89 SWELLING OF BOTH LOWER EXTREMITIES: ICD-10-CM

## 2018-11-20 DIAGNOSIS — M79.605 PAIN IN BOTH LOWER EXTREMITIES: ICD-10-CM

## 2018-11-20 DIAGNOSIS — I10 ESSENTIAL HYPERTENSION: Primary | ICD-10-CM

## 2018-11-20 DIAGNOSIS — E78.00 PURE HYPERCHOLESTEROLEMIA: ICD-10-CM

## 2018-11-20 PROCEDURE — 99213 OFFICE O/P EST LOW 20 MIN: CPT | Performed by: NURSE PRACTITIONER

## 2018-11-20 RX ORDER — CLONIDINE HYDROCHLORIDE 0.1 MG/1
0.1 TABLET ORAL 2 TIMES DAILY PRN
Qty: 60 TABLET | Refills: 1 | Status: SHIPPED | OUTPATIENT
Start: 2018-11-20 | End: 2023-01-01

## 2018-11-20 RX ORDER — LABETALOL 200 MG/1
200 TABLET, FILM COATED ORAL 2 TIMES DAILY
Qty: 180 TABLET | Refills: 2 | Status: SHIPPED | OUTPATIENT
Start: 2018-11-20

## 2018-11-20 RX ORDER — HYDRALAZINE HYDROCHLORIDE 50 MG/1
50 TABLET, FILM COATED ORAL 2 TIMES DAILY
Qty: 180 TABLET | Refills: 0 | Status: SHIPPED | OUTPATIENT
Start: 2018-11-20 | End: 2019-03-02 | Stop reason: SDUPTHER

## 2018-11-20 NOTE — PROGRESS NOTES
Subjective   Maximo Gonzalez is a 88 y.o. male.     Patient needs refill Clonidine  Long history difficult to control labile hypertension  Takes hydralazine a bedtime 50 mg  His blood pressures been running elevated more frequently  Sometimes the 180s up to 200 systolic  Diastolic between 60 and 80  Sometimes flushing in the face but no chest pain shortness of breath headache or dizziness    Long history of using Clonidine occasionally  Basically needs new refill  His old one is   Appointment soon  with nephrology and cardiology    Needs bp change    In past would take clonidine with in crease bp    Chronic neck pain    bp  3 weeks    Rarely takes clonidine    Pain pump  bp         The following portions of the patient's history were reviewed and updated as appropriate: allergies, current medications, past family history, past medical history, past social history, past surgical history and problem list.    Review of Systems   Constitutional: Negative for fatigue and fever.   HENT: Negative.  Negative for trouble swallowing.    Eyes: Negative.    Respiratory: Negative.  Negative for cough and shortness of breath.    Cardiovascular: Negative for chest pain, palpitations and leg swelling.   Gastrointestinal: Negative.  Negative for abdominal pain.   Genitourinary: Negative.    Musculoskeletal: Negative.    Skin: Negative.    Neurological: Negative.  Negative for dizziness and confusion.   Psychiatric/Behavioral: Negative.        Objective   Physical Exam   Constitutional: He is oriented to person, place, and time. He appears well-developed and well-nourished. No distress.   HENT:   Head: Normocephalic and atraumatic.   Nose: Nose normal.   Mouth/Throat: Oropharynx is clear and moist.   Eyes: Conjunctivae are normal. Pupils are equal, round, and reactive to light. No scleral icterus.   Neck: Neck supple. No JVD present. No thyromegaly present.   Cardiovascular: Normal rate, regular rhythm and normal heart sounds.  Exam reveals no gallop and no friction rub.   No murmur heard.  Pulmonary/Chest: Effort normal and breath sounds normal. No respiratory distress. He has no wheezes. He has no rales.   Abdominal: Soft. Bowel sounds are normal. He exhibits no distension and no mass. There is no tenderness. There is no guarding. No hernia.   Musculoskeletal: He exhibits edema and tenderness.   Mild tenderness calves bilateral  No redness  Appear equal in size  Posterior fossa nontender  Bilateral knee pain  No groin tenderness or swelling  Mild edema   Lymphadenopathy:     He has no cervical adenopathy.   Neurological: He is alert and oriented to person, place, and time. He has normal reflexes.   Skin: Skin is warm and dry. No rash noted. He is not diaphoretic. No erythema.   Psychiatric: He has a normal mood and affect. His behavior is normal. Judgment and thought content normal.   Vitals reviewed.        Assessment/Plan   Maximo was seen today for transfer of dr. russell.    Diagnoses and all orders for this visit:    Essential hypertension  -     TSH Rfx On Abnormal To Free T4  -     Comprehensive Metabolic Panel  -     CBC & Differential  -     Duplex Venous Lower Extremity - Bilateral CAR    Pure hypercholesterolemia  -     TSH Rfx On Abnormal To Free T4  -     Comprehensive Metabolic Panel  -     CBC & Differential  -     Duplex Venous Lower Extremity - Bilateral CAR    Swelling of both lower extremities  -     Duplex Venous Lower Extremity - Bilateral CAR    Pain in both lower extremities  -     Duplex Venous Lower Extremity - Bilateral CAR    Other orders  -     hydrALAZINE (APRESOLINE) 50 MG tablet; Take 1 tablet by mouth 2 (Two) Times a Day. For bp  -     CloNIDine (CATAPRES) 0.1 MG tablet; Take 1 tablet by mouth 2 (Two) Times a Day As Needed for High Blood Pressure.                  Patient has chronic edema  He has had more recently  As well as some tenderness bilateral calves  Outpatient venous Doppler study  Patient family  do not want to get this stat  I do not have high suspicion of clot but  We should get this as soon as possible then  Family patient agrees but  Plan to get next week  Chest pain shortness of breath emergency room  They both understand that although  I do not have high suspicion  Deep vein thrombosis may cause pulmogram blite life threatening    Discussed department treatment blood pressure  Avoid abrupt decrease in blood pressure which could cause stroke  Refills with caution  Routine followup  Systolic up to 150 approximately is generally acceptable for chronic blood pressure

## 2018-11-20 NOTE — PATIENT INSTRUCTIONS
Discharge instructions    Clonidine with caution one twice daily when necessary high blood pressure  As discussed caution may cause falls or low blood pressure    Hydralazine 50 mg twice daily  We may need to decrease this to once daily shortly as discussed    Call me several days of blood pressure readings in one week    If severe headache weakness chest pain shortness of breath increased dizziness emergency room    Thank You,      James Epley,  NP

## 2018-11-21 RX ORDER — PROMETHAZINE HYDROCHLORIDE 25 MG/1
25 TABLET ORAL 2 TIMES DAILY PRN
OUTPATIENT
Start: 2018-11-21

## 2018-11-21 RX ORDER — PROMETHAZINE HYDROCHLORIDE 12.5 MG/1
6.25 TABLET ORAL 2 TIMES DAILY PRN
Qty: 30 TABLET | Refills: 0 | Status: SHIPPED | OUTPATIENT
Start: 2018-11-21 | End: 2019-03-21 | Stop reason: SDUPTHER

## 2018-11-21 NOTE — TELEPHONE ENCOUNTER
See if patient's willing to try a low dose Zofran as needed for nausea  Less sedating less likely to cause falls  If so give him Zofran 4 mg    One half to one tablet twice daily as needed for anxiety  Dispense 30    If not  I would like to decrease the promethazine due to risk of falls    Promethazine 12.5 mg one twice daily as needed for nausea vomiting  Caution increased risk of falls sedation  Dispense 30 no refill    Thank you

## 2018-11-24 ENCOUNTER — HOSPITAL ENCOUNTER (EMERGENCY)
Facility: HOSPITAL | Age: 83
Discharge: HOME | End: 2018-11-24
Attending: EMERGENCY MEDICINE | Admitting: EMERGENCY MEDICINE

## 2018-11-24 VITALS
WEIGHT: 140 LBS | TEMPERATURE: 98.5 F | BODY MASS INDEX: 22.5 KG/M2 | RESPIRATION RATE: 18 BRPM | HEART RATE: 61 BPM | SYSTOLIC BLOOD PRESSURE: 175 MMHG | HEIGHT: 66 IN | DIASTOLIC BLOOD PRESSURE: 82 MMHG | OXYGEN SATURATION: 97 %

## 2018-11-24 DIAGNOSIS — I10 UNCONTROLLED HYPERTENSION: Primary | ICD-10-CM

## 2018-11-24 LAB
ALBUMIN SERPL-MCNC: 4.4 G/DL (ref 3.5–5.2)
ALBUMIN/GLOB SERPL: 1.6 G/DL
ALP SERPL-CCNC: 69 U/L (ref 39–117)
ALT SERPL W P-5'-P-CCNC: 27 U/L (ref 1–41)
ANION GAP SERPL CALCULATED.3IONS-SCNC: 14.1 MMOL/L
AST SERPL-CCNC: 27 U/L (ref 1–40)
BASOPHILS # BLD AUTO: 0.04 10*3/MM3 (ref 0–0.2)
BASOPHILS NFR BLD AUTO: 0.6 % (ref 0–1.5)
BILIRUB SERPL-MCNC: 0.5 MG/DL (ref 0.1–1.2)
BILIRUB UR QL STRIP: NEGATIVE
BUN BLD-MCNC: 35 MG/DL (ref 8–23)
BUN/CREAT SERPL: 30.2 (ref 7–25)
CALCIUM SPEC-SCNC: 10.4 MG/DL (ref 8.6–10.5)
CHLORIDE SERPL-SCNC: 98 MMOL/L (ref 98–107)
CLARITY UR: CLEAR
CO2 SERPL-SCNC: 25.9 MMOL/L (ref 22–29)
COLOR UR: YELLOW
CREAT BLD-MCNC: 1.16 MG/DL (ref 0.76–1.27)
DEPRECATED RDW RBC AUTO: 44.2 FL (ref 37–54)
EOSINOPHIL # BLD AUTO: 0.22 10*3/MM3 (ref 0–0.7)
EOSINOPHIL NFR BLD AUTO: 3.5 % (ref 0.3–6.2)
ERYTHROCYTE [DISTWIDTH] IN BLOOD BY AUTOMATED COUNT: 12.7 % (ref 11.5–14.5)
GFR SERPL CREATININE-BSD FRML MDRD: 59 ML/MIN/1.73
GLOBULIN UR ELPH-MCNC: 2.8 GM/DL
GLUCOSE BLD-MCNC: 118 MG/DL (ref 65–99)
GLUCOSE UR STRIP-MCNC: NEGATIVE MG/DL
HCT VFR BLD AUTO: 37.4 % (ref 40.4–52.2)
HGB BLD-MCNC: 11.3 G/DL (ref 13.7–17.6)
HGB UR QL STRIP.AUTO: NEGATIVE
IMM GRANULOCYTES # BLD: 0 10*3/MM3 (ref 0–0.03)
IMM GRANULOCYTES NFR BLD: 0 % (ref 0–0.5)
KETONES UR QL STRIP: NEGATIVE
LEUKOCYTE ESTERASE UR QL STRIP.AUTO: NEGATIVE
LYMPHOCYTES # BLD AUTO: 1.31 10*3/MM3 (ref 0.9–4.8)
LYMPHOCYTES NFR BLD AUTO: 20.8 % (ref 19.6–45.3)
MCH RBC QN AUTO: 28.8 PG (ref 27–32.7)
MCHC RBC AUTO-ENTMCNC: 30.2 G/DL (ref 32.6–36.4)
MCV RBC AUTO: 95.2 FL (ref 79.8–96.2)
MONOCYTES # BLD AUTO: 0.53 10*3/MM3 (ref 0.2–1.2)
MONOCYTES NFR BLD AUTO: 8.4 % (ref 5–12)
NEUTROPHILS # BLD AUTO: 4.2 10*3/MM3 (ref 1.9–8.1)
NEUTROPHILS NFR BLD AUTO: 66.7 % (ref 42.7–76)
NITRITE UR QL STRIP: NEGATIVE
PH UR STRIP.AUTO: 6.5 [PH] (ref 5–8)
PLATELET # BLD AUTO: 273 10*3/MM3 (ref 140–500)
PMV BLD AUTO: 9.5 FL (ref 6–12)
POTASSIUM BLD-SCNC: 4.7 MMOL/L (ref 3.5–5.2)
PROT SERPL-MCNC: 7.2 G/DL (ref 6–8.5)
PROT UR QL STRIP: NEGATIVE
RBC # BLD AUTO: 3.93 10*6/MM3 (ref 4.6–6)
SODIUM BLD-SCNC: 138 MMOL/L (ref 136–145)
SP GR UR STRIP: 1.01 (ref 1–1.03)
TROPONIN T SERPL-MCNC: 0.02 NG/ML (ref 0–0.03)
UROBILINOGEN UR QL STRIP: NORMAL
WBC NRBC COR # BLD: 6.3 10*3/MM3 (ref 4.5–10.7)

## 2018-11-24 PROCEDURE — 93010 ELECTROCARDIOGRAM REPORT: CPT | Performed by: INTERNAL MEDICINE

## 2018-11-24 PROCEDURE — 93005 ELECTROCARDIOGRAM TRACING: CPT | Performed by: EMERGENCY MEDICINE

## 2018-11-24 PROCEDURE — 85025 COMPLETE CBC W/AUTO DIFF WBC: CPT | Performed by: EMERGENCY MEDICINE

## 2018-11-24 PROCEDURE — 81003 URINALYSIS AUTO W/O SCOPE: CPT | Performed by: EMERGENCY MEDICINE

## 2018-11-24 PROCEDURE — 96376 TX/PRO/DX INJ SAME DRUG ADON: CPT

## 2018-11-24 PROCEDURE — 96374 THER/PROPH/DIAG INJ IV PUSH: CPT

## 2018-11-24 PROCEDURE — 84484 ASSAY OF TROPONIN QUANT: CPT | Performed by: EMERGENCY MEDICINE

## 2018-11-24 PROCEDURE — 80053 COMPREHEN METABOLIC PANEL: CPT | Performed by: EMERGENCY MEDICINE

## 2018-11-24 PROCEDURE — 99284 EMERGENCY DEPT VISIT MOD MDM: CPT

## 2018-11-24 RX ORDER — SODIUM CHLORIDE 0.9 % (FLUSH) 0.9 %
10 SYRINGE (ML) INJECTION AS NEEDED
Status: DISCONTINUED | OUTPATIENT
Start: 2018-11-24 | End: 2018-11-24 | Stop reason: HOSPADM

## 2018-11-24 RX ORDER — LABETALOL HYDROCHLORIDE 5 MG/ML
10 INJECTION, SOLUTION INTRAVENOUS ONCE
Status: COMPLETED | OUTPATIENT
Start: 2018-11-24 | End: 2018-11-24

## 2018-11-24 RX ADMIN — LABETALOL HYDROCHLORIDE 10 MG: 5 INJECTION, SOLUTION INTRAVENOUS at 15:00

## 2018-11-24 RX ADMIN — LABETALOL HYDROCHLORIDE 10 MG: 5 INJECTION, SOLUTION INTRAVENOUS at 13:52

## 2018-11-24 NOTE — ED PROVIDER NOTES
EMERGENCY DEPARTMENT ENCOUNTER    CHIEF COMPLAINT  Chief Complaint: increased urinary frequency  History given by: patient  History limited by: none  Room Number: 15/15  PMD: Epley, James, APRN Dr. Imburgia, cardiology    HPI:  Pt is a 88 y.o. male who presents to the ED via EMS w/ daughter bedside. Pt comes in today complaining of increased urinary frequency and dysuria w/ vague HA and facial flushing. Pt reports that while he was at home this morning he checked his BP. His BP was consistently 200s/100s. He reports coming to the ED before due to HTN that was not responding to Rx mediations. Pt is complaint with is medications and took an extra 1/2 tablet of his HTN medications w/o relief. He denies n/v/d, fever, CP, SOA, abd pain, and black/tarry stool. Pt is not a smoker and does not use ETOH. Pt lives alone at home.    Duration/Onset/Timing: since this a.m.  Location: n/a  Radiation: n/a  Quality: increased  Intensity/Severity: moderate  Associated Symptoms: dysuria, vague HA, and facial flushing  Aggravating or Alleviating Factors: none  Previous Episodes: pt has hx of HTN.      PAST MEDICAL HISTORY  Active Ambulatory Problems     Diagnosis Date Noted   • Chronic pain syndrome 03/11/2016   • Hyperlipidemia 03/11/2016   • Hypertension 03/11/2016   • Peripheral vascular disease (CMS/HCC) 03/11/2016   • High risk medication use 03/29/2016   • Swelling of both lower extremities 03/29/2016   • Primary insomnia 03/21/2017   • Chronic fatigue 03/21/2017     Resolved Ambulatory Problems     Diagnosis Date Noted   • No Resolved Ambulatory Problems     Past Medical History:   Diagnosis Date   • Atherosclerotic heart disease of native coronary artery without angina pectoris    • BPH (benign prostatic hypertrophy)    • Chronic pain syndrome    • Colon cancer (CMS/HCC)    • Degeneration of cervical intervertebral disc    • Gastritis    • Hypertension    • Renal artery stenosis (CMS/HCC)    • Stroke (CMS/HCC)    • Vascular  disease        PAST SURGICAL HISTORY  Past Surgical History:   Procedure Laterality Date   • ATHERECTOMY Right     superficial femoral artery   • CAROTID ENDARTERECTOMY      Right carotid endarterectomy. ReOP    • CERVICAL LAMINECTOMY     • CHOLECYSTECTOMY     • COLECTOMY PARTIAL / TOTAL      colon resection   • CORONARY ANGIOPLASTY WITH STENT PLACEMENT      Angioplasty and stent placement in LAD and evidence of restenosis w/ repeat angioplasty in 2002.  Cath in  showed no evidence of restenosis.   • LAPAROSCOPIC INGUINAL HERNIA REPAIR Left 2016    Dr. Neil Thomas   • OTHER SURGICAL HISTORY      Bypass Graft (non-vein) iliorenal   • PAIN PUMP INSERTION/REVISION Left     dilaudid    • TURP / TRANSURETHRAL INCISION / DRAINAGE PROSTATE         FAMILY HISTORY  Family History   Problem Relation Age of Onset   • Cancer Mother         Breast   • No Known Problems Father        SOCIAL HISTORY  Social History     Socioeconomic History   • Marital status:      Spouse name: Not on file   • Number of children: Not on file   • Years of education: Not on file   • Highest education level: Not on file   Social Needs   • Financial resource strain: Not on file   • Food insecurity - worry: Not on file   • Food insecurity - inability: Not on file   • Transportation needs - medical: Not on file   • Transportation needs - non-medical: Not on file   Occupational History   • Not on file   Tobacco Use   • Smoking status: Former Smoker     Types: Cigarettes     Last attempt to quit: 1986     Years since quittin.8   • Smokeless tobacco: Never Used   Substance and Sexual Activity   • Alcohol use: No   • Drug use: No   • Sexual activity: Defer   Other Topics Concern   • Not on file   Social History Narrative   • Not on file       ALLERGIES  Promethazine    REVIEW OF SYSTEMS  Review of Systems   Constitutional: Negative.  Negative for fever.   HENT: Negative for sore throat.    Eyes: Negative.     Respiratory: Negative.  Negative for cough.    Cardiovascular: Negative.  Negative for chest pain.   Gastrointestinal: Negative.    Genitourinary: Positive for dysuria and frequency (increased).   Musculoskeletal: Negative.  Negative for back pain.   Skin: Negative.  Negative for rash.   Neurological: Positive for headaches (vague).   All other systems reviewed and are negative.      PHYSICAL EXAM  ED Triage Vitals [11/24/18 1315]   Temp Heart Rate Resp BP SpO2   98.5 °F (36.9 °C) 75 18 (!) 215/100 96 %      Temp src Heart Rate Source Patient Position BP Location FiO2 (%)   Tympanic -- -- -- --       Physical Exam   Constitutional: No distress.   HENT:   Head: Normocephalic and atraumatic.   Mouth/Throat: Oropharynx is clear and moist.   Eyes: Conjunctivae are normal.   Neck:   Pt has foam c-collar in place. Pt states he had a fusion 30 years ago and uses it to help w/ chronic neck pain.   Cardiovascular: Normal rate and regular rhythm.   Murmur (mild) heard.   Systolic murmur is present.  Pulmonary/Chest: Breath sounds normal. No respiratory distress.   Abdominal: There is no tenderness.   Musculoskeletal: He exhibits no tenderness.   1+ bilateral pedal edema   Neurological: He is alert. He has normal sensation and normal strength. He has a normal Straight Leg Raise Test and a normal Finger-Nose-Finger Test.   Skin: No rash noted.   Nursing note and vitals reviewed.      LAB RESULTS  Lab Results (last 24 hours)     Procedure Component Value Units Date/Time    CBC & Differential [[id number]] Collected:  11/24/18 1344    Specimen:  Blood Updated:  11/24/18 1403    Narrative:       The following orders were created for panel order CBC & Differential.  Procedure                               Abnormality         Status                     ---------                               -----------         ------                     CBC Auto Differential[[id number]]        Abnormal            Final result                 Please  view results for these tests on the individual orders.    Comprehensive Metabolic Panel [[id number]]  (Abnormal) Collected:  11/24/18 1344    Specimen:  Blood Updated:  11/24/18 1419     Glucose 118 mg/dL      BUN 35 mg/dL      Creatinine 1.16 mg/dL      Sodium 138 mmol/L      Potassium 4.7 mmol/L      Chloride 98 mmol/L      CO2 25.9 mmol/L      Calcium 10.4 mg/dL      Total Protein 7.2 g/dL      Albumin 4.40 g/dL      ALT (SGPT) 27 U/L      AST (SGOT) 27 U/L      Alkaline Phosphatase 69 U/L      Total Bilirubin 0.5 mg/dL      eGFR Non African Amer 59 mL/min/1.73      Globulin 2.8 gm/dL      A/G Ratio 1.6 g/dL      BUN/Creatinine Ratio 30.2     Anion Gap 14.1 mmol/L     Narrative:       The MDRD GFR formula is only valid for adults with stable renal function between ages 18 and 70.    Troponin [[id number]]  (Normal) Collected:  11/24/18 1344    Specimen:  Blood Updated:  11/24/18 1419     Troponin T 0.023 ng/mL     Narrative:       Troponin T Reference Ranges:  Less than 0.03 ng/mL:    Negative for AMI  0.03 to 0.09 ng/mL:      Indeterminant for AMI  Greater than 0.09 ng/mL: Positive for AMI    CBC Auto Differential [[id number]]  (Abnormal) Collected:  11/24/18 1344    Specimen:  Blood Updated:  11/24/18 1403     WBC 6.30 10*3/mm3      RBC 3.93 10*6/mm3      Hemoglobin 11.3 g/dL      Hematocrit 37.4 %      MCV 95.2 fL      MCH 28.8 pg      MCHC 30.2 g/dL      RDW 12.7 %      RDW-SD 44.2 fl      MPV 9.5 fL      Platelets 273 10*3/mm3      Neutrophil % 66.7 %      Lymphocyte % 20.8 %      Monocyte % 8.4 %      Eosinophil % 3.5 %      Basophil % 0.6 %      Immature Grans % 0.0 %      Neutrophils, Absolute 4.20 10*3/mm3      Lymphocytes, Absolute 1.31 10*3/mm3      Monocytes, Absolute 0.53 10*3/mm3      Eosinophils, Absolute 0.22 10*3/mm3      Basophils, Absolute 0.04 10*3/mm3      Immature Grans, Absolute 0.00 10*3/mm3     Urinalysis With Microscopic If Indicated (No Culture) - Urine, Clean Catch [[id number]]  (Normal)  Collected:  11/24/18 1344    Specimen:  Urine, Clean Catch Updated:  11/24/18 1354     Color, UA Yellow     Appearance, UA Clear     pH, UA 6.5     Specific Gravity, UA 1.009     Glucose, UA Negative     Ketones, UA Negative     Bilirubin, UA Negative     Blood, UA Negative     Protein, UA Negative     Leuk Esterase, UA Negative     Nitrite, UA Negative     Urobilinogen, UA 0.2 E.U./dL    Narrative:       Urine microscopic not indicated.          I ordered the above labs and reviewed the results    PROCEDURES  Procedures    EKG          EKG time: 1348  Rhythm/Rate: NSR 64  P waves and MT: nml  QRS, axis: nml   ST and T waves: nml     Interpreted Contemporaneously by me, independently viewed  unchanged compared to prior 2017    PROGRESS AND CONSULTS     1504  BP-180/81 HR- 65 Temp- 98.5 °F (36.9 °C) (Tympanic) O2 sat- 96%  Rechecked the patient who is in NAD and is resting comfortably. Discussed labs being unremarkable and that his BP appears to be to be responding to IV medication. Pt told the plan to observe pt a little while longer to make sure his BP stays down. Pt told the plan to d/c if BP remains the same and him to f/u w/ his PCP for his BP. Pt understands and agrees with the plan, all questions answered.    MEDICAL DECISION MAKING  Results were reviewed/discussed with the patient and they were also made aware of online access. Pt also made aware that some labs, such as cultures, will not be resulted during ER visit and follow up with PMD is necessary.     MDM       DIAGNOSIS  Final diagnoses:   Uncontrolled hypertension       DISPOSITION  DISCHARGE    Patient discharged in stable condition.    Reviewed implications of results, diagnosis, meds, responsibility to follow up, warning signs and symptoms of possible worsening, potential complications and reasons to return to ER.    Patient/Family voiced understanding of above instructions.    Discussed plan for discharge, as there is no emergent indication for  admission. Patient referred to primary care provider for BP management due to today's BP. Pt/family is agreeable and understands need for follow up and repeat testing.  Pt is aware that discharge does not mean that nothing is wrong but it indicates no emergency is present that requires admission and they must continue care with follow-up as given below or physician of their choice.     FOLLOW-UP  No follow-up provider specified.       Medication List      No changes were made to your prescriptions during this visit.       Latest Documented Vital Signs:  As of 3:25 PM  BP- (!) 191/87 HR- 65 Temp- 98.5 °F (36.9 °C) (Tympanic) O2 sat- 96%    --  Documentation assistance provided by rita Logan for Dr. Diaz.  Information recorded by the scribe was done at my direction and has been verified and validated by me.     Kasandra Logan  11/24/18 5373       Mikel Diaz MD  11/24/18 6332

## 2018-11-24 NOTE — ED TRIAGE NOTES
EMS reports pt called for high blood pressure. Pt complains of a headache. Pt reports he takes blood pressure medication but it is not controlling his blood pressure

## 2018-11-24 NOTE — ED NOTES
"Pt reports he has \"trouble\" with his blood pressure going up. Pt reports he took 0.15 mg of clonidine this morning and \"just couldn't get it to go down\". Pt reports mild headache. Pt denies n/v or blurry vision. Daughter at bedside.     Lydia Juarez RN  11/24/18 6076    "

## 2018-11-26 ENCOUNTER — TELEPHONE (OUTPATIENT)
Dept: FAMILY MEDICINE CLINIC | Facility: CLINIC | Age: 83
End: 2018-11-26

## 2018-11-26 NOTE — TELEPHONE ENCOUNTER
PATIENT DAUGHTER CALLED WITH BLOOD PRESSURE READING 231/104 @ 11:15, 204/81 @12.15, 221/104 @ 2:30. TOOK CLONIDINE @ 11:00. HE WAS SEEN IN ER ON 11/24 FOR HYPERTENSION.      PLEASE ADVISE.

## 2018-11-26 NOTE — TELEPHONE ENCOUNTER
Okay to repeat clonidine ×1  Recheck blood pressure  In one hour  If not less than 180s systolic  Go to the emergency room    Or if chest pain shortness of breath severe headache weakness confusion  ER

## 2018-11-29 ENCOUNTER — HOSPITAL ENCOUNTER (OUTPATIENT)
Dept: CARDIOLOGY | Facility: HOSPITAL | Age: 83
Discharge: HOME | End: 2018-11-29
Admitting: NURSE PRACTITIONER

## 2018-11-29 ENCOUNTER — OFFICE VISIT (OUTPATIENT)
Dept: FAMILY MEDICINE CLINIC | Facility: CLINIC | Age: 83
End: 2018-11-29

## 2018-11-29 VITALS
WEIGHT: 140 LBS | HEIGHT: 66 IN | HEART RATE: 67 BPM | DIASTOLIC BLOOD PRESSURE: 80 MMHG | SYSTOLIC BLOOD PRESSURE: 190 MMHG | BODY MASS INDEX: 22.5 KG/M2 | OXYGEN SATURATION: 92 %

## 2018-11-29 DIAGNOSIS — R09.89 LABILE HYPERTENSION: Primary | ICD-10-CM

## 2018-11-29 DIAGNOSIS — I10 ESSENTIAL HYPERTENSION: ICD-10-CM

## 2018-11-29 LAB

## 2018-11-29 PROCEDURE — 99213 OFFICE O/P EST LOW 20 MIN: CPT | Performed by: NURSE PRACTITIONER

## 2018-11-29 PROCEDURE — 93970 EXTREMITY STUDY: CPT

## 2018-11-29 RX ORDER — AMLODIPINE BESYLATE 5 MG/1
5 TABLET ORAL DAILY
Qty: 30 TABLET | Refills: 1 | Status: SHIPPED | OUTPATIENT
Start: 2018-11-29 | End: 2019-01-22 | Stop reason: SDUPTHER

## 2018-12-03 NOTE — PROGRESS NOTES
Subjective   Maximo Gonzalez is a 88 y.o. male.      Follow-up recently elevated blood pressure  Recently 227 at home  Some flushing which is his norm when his blood pressure elevates    Long history labile hypertension  No contraindications to amlodipine  Although he does have chronic dependent edema    Daughter retired nurse  They stated that they generally take Clonidine to bring his blood pressure down  Previously hydralazine 50 mg was only once daily  But we increased to twice daily recently    No chest pain shortness of breath  No confusion weakness  No paresthesias no severe headache    Confirm blood pressure today manual recheck similar results      Hypertension   Pertinent negatives include no chest pain, palpitations or shortness of breath.        The following portions of the patient's history were reviewed and updated as appropriate: allergies, current medications, past family history, past medical history, past social history, past surgical history and problem list.    Review of Systems   Constitutional: Negative for chills, diaphoresis, fatigue and fever.   Respiratory: Negative for shortness of breath.    Cardiovascular: Positive for leg swelling. Negative for chest pain and palpitations.   Neurological: Negative.  Negative for dizziness.   All other systems reviewed and are negative.      Objective   Physical Exam   Constitutional: He is oriented to person, place, and time. He appears well-developed and well-nourished. No distress.   HENT:   Head: Normocephalic and atraumatic.   Nose: Nose normal.   Mouth/Throat: Oropharynx is clear and moist.   Eyes: Conjunctivae are normal. Pupils are equal, round, and reactive to light.   Neck: Neck supple.   Cardiovascular: Normal rate and regular rhythm.   No murmur heard.  Pulmonary/Chest: Effort normal and breath sounds normal. No respiratory distress. He has no wheezes.   unlabored   Abdominal: Soft. Bowel sounds are normal. He exhibits no distension and no  mass. There is no tenderness. There is no guarding. No hernia.   Musculoskeletal: He exhibits edema. He exhibits no tenderness.   Bilateral ankle edema no calf tenderness    Chronic generalize weakness due to his age  But no focal weakness   Lymphadenopathy:     He has no cervical adenopathy.   Neurological: He is alert and oriented to person, place, and time. No cranial nerve deficit.   Skin: Skin is warm and dry. He is not diaphoretic.   Psychiatric: He has a normal mood and affect. His behavior is normal. Judgment and thought content normal.   Vitals reviewed.        Assessment/Plan   Maximo was seen today for hypertension.    Diagnoses and all orders for this visit:    Labile hypertension    Essential hypertension    Other orders  -     amLODIPine (NORVASC) 5 MG tablet; Take 1 tablet by mouth Daily. For htn            They will call me Monday blood pressure readings  Caution hypotension  Gradual reduction of blood pressure is preferable        Discharge instructions    Start amlodipine 5 mg one half tablet daily  Increase to one tablet daily if blood pressure remains greater than 160    Caution hypotension  Recheck in one week    Check blood pressure daily keep log

## 2019-01-23 RX ORDER — AMLODIPINE BESYLATE 5 MG/1
TABLET ORAL
Qty: 90 TABLET | Refills: 0 | Status: SHIPPED | OUTPATIENT
Start: 2019-01-23 | End: 2019-02-16 | Stop reason: SDUPTHER

## 2019-01-23 RX ORDER — AMLODIPINE BESYLATE 5 MG/1
TABLET ORAL
Qty: 30 TABLET | Refills: 1 | OUTPATIENT
Start: 2019-01-23

## 2019-02-07 RX ORDER — FUROSEMIDE 40 MG/1
40 TABLET ORAL DAILY
Qty: 30 TABLET | Refills: 2 | Status: SHIPPED | OUTPATIENT
Start: 2019-02-07 | End: 2019-02-14 | Stop reason: SDUPTHER

## 2019-02-14 RX ORDER — FUROSEMIDE 40 MG/1
40 TABLET ORAL DAILY
Qty: 90 TABLET | Refills: 0 | Status: SHIPPED | OUTPATIENT
Start: 2019-02-14 | End: 2019-03-21

## 2019-02-18 RX ORDER — AMLODIPINE BESYLATE 5 MG/1
TABLET ORAL
Qty: 90 TABLET | Refills: 0 | Status: SHIPPED | OUTPATIENT
Start: 2019-02-18 | End: 2019-03-04 | Stop reason: SDUPTHER

## 2019-02-28 RX ORDER — BENAZEPRIL/HYDROCHLOROTHIAZIDE 20 MG-25MG
1 TABLET ORAL DAILY
Qty: 90 TABLET | Refills: 1 | Status: SHIPPED | OUTPATIENT
Start: 2019-02-28 | End: 2019-03-21

## 2019-03-04 ENCOUNTER — TELEPHONE (OUTPATIENT)
Dept: FAMILY MEDICINE CLINIC | Facility: CLINIC | Age: 84
End: 2019-03-04

## 2019-03-04 RX ORDER — AMLODIPINE BESYLATE 10 MG/1
10 TABLET ORAL DAILY
Qty: 30 TABLET | Refills: 1 | Status: SHIPPED | OUTPATIENT
Start: 2019-03-04 | End: 2023-01-01

## 2019-03-04 RX ORDER — HYDRALAZINE HYDROCHLORIDE 50 MG/1
TABLET, FILM COATED ORAL
Qty: 180 TABLET | Refills: 0 | Status: SHIPPED | OUTPATIENT
Start: 2019-03-04 | End: 2019-07-16 | Stop reason: SDUPTHER

## 2019-03-04 NOTE — TELEPHONE ENCOUNTER
I gave patient a new prescription for amlodipine 10 mg    Extra 7.5 mg today to equal 10 mg total today only    Then 10 mg daily amlodipine    Office visit 1 week  If chest pain shortness of breath weakness severe headache emergency room    Call blood pressure readings to me in 2 days    Thank you

## 2019-03-04 NOTE — TELEPHONE ENCOUNTER
Patient Blood pressure as follow-up 209/101, 189/98, 201/100, 194/96, 194/96    Patient daughter would like to increase the amlodipine 5 mg.    Advise.

## 2019-03-21 ENCOUNTER — OFFICE VISIT (OUTPATIENT)
Dept: FAMILY MEDICINE CLINIC | Facility: CLINIC | Age: 84
End: 2019-03-21

## 2019-03-21 VITALS
HEIGHT: 66 IN | OXYGEN SATURATION: 94 % | BODY MASS INDEX: 21.53 KG/M2 | HEART RATE: 84 BPM | SYSTOLIC BLOOD PRESSURE: 144 MMHG | WEIGHT: 134 LBS | DIASTOLIC BLOOD PRESSURE: 58 MMHG

## 2019-03-21 DIAGNOSIS — I95.89 HYPOTENSION DUE TO HYPOVOLEMIA: ICD-10-CM

## 2019-03-21 DIAGNOSIS — M79.89 SWELLING OF BOTH LOWER EXTREMITIES: ICD-10-CM

## 2019-03-21 DIAGNOSIS — I10 ESSENTIAL HYPERTENSION: Primary | ICD-10-CM

## 2019-03-21 DIAGNOSIS — E86.1 HYPOTENSION DUE TO HYPOVOLEMIA: ICD-10-CM

## 2019-03-21 PROCEDURE — 99213 OFFICE O/P EST LOW 20 MIN: CPT | Performed by: NURSE PRACTITIONER

## 2019-03-21 RX ORDER — BENAZEPRIL HYDROCHLORIDE 20 MG/1
20 TABLET ORAL DAILY
Qty: 90 TABLET | Refills: 1 | Status: SHIPPED | OUTPATIENT
Start: 2019-03-21

## 2019-03-21 NOTE — PATIENT INSTRUCTIONS
Discharge instructions      Discontinue benazepril HCTZ  Starting tomorrow  Take benazepril 20 mg daily  Take Lasix 20 mg daily for blood pressure and edema control  Elevate legs whenever sitting if possible  Continue amlodipine labetalol hydralazine no changes here    Clonidine with caution  If blood pressure 170 or higher  And you feel okay  Do nothing different simply recheck in 1 hour  If still elevated take clonidine x1    Weakness chest pain shortness of breath syncope emergency room      Check blood pressure once a day  And record  Treat trends not isolated numbers  If greater than 170 follow instructions above    If dizziness check blood pressure  If low such as less than 100  Drink a couple extra glasses of water  And falls precaution    If prolonged low blood pressure go to the emergency room    Call blood pressure readings in 1 week    Call for appointment with cardiology  Discussed blood pressure control with cardiology and plan    Generally 150 range systolic or top number  Is acceptable for long-term blood pressure management for your age

## 2019-03-22 LAB
ALBUMIN SERPL-MCNC: 4.9 G/DL (ref 3.5–5.2)
ALBUMIN/GLOB SERPL: 2.6 G/DL
ALP SERPL-CCNC: 56 U/L (ref 39–117)
ALT SERPL-CCNC: 30 U/L (ref 1–41)
AST SERPL-CCNC: 30 U/L (ref 1–40)
BASOPHILS # BLD AUTO: 0.06 10*3/MM3 (ref 0–0.2)
BASOPHILS NFR BLD AUTO: 0.9 % (ref 0–1.5)
BILIRUB SERPL-MCNC: 0.5 MG/DL (ref 0.2–1.2)
BUN SERPL-MCNC: 46 MG/DL (ref 8–23)
BUN/CREAT SERPL: 41.4 (ref 7–25)
CALCIUM SERPL-MCNC: 10.6 MG/DL (ref 8.6–10.5)
CHLORIDE SERPL-SCNC: 96 MMOL/L (ref 98–107)
CO2 SERPL-SCNC: 27 MMOL/L (ref 22–29)
CREAT SERPL-MCNC: 1.11 MG/DL (ref 0.76–1.27)
EOSINOPHIL # BLD AUTO: 0.26 10*3/MM3 (ref 0–0.4)
EOSINOPHIL NFR BLD AUTO: 4.1 % (ref 0.3–6.2)
ERYTHROCYTE [DISTWIDTH] IN BLOOD BY AUTOMATED COUNT: 12.4 % (ref 12.3–15.4)
GLOBULIN SER CALC-MCNC: 1.9 GM/DL
GLUCOSE SERPL-MCNC: 98 MG/DL (ref 65–99)
HCT VFR BLD AUTO: 36.4 % (ref 37.5–51)
HGB BLD-MCNC: 11.1 G/DL (ref 13–17.7)
IMM GRANULOCYTES # BLD AUTO: 0.02 10*3/MM3 (ref 0–0.05)
IMM GRANULOCYTES NFR BLD AUTO: 0.3 % (ref 0–0.5)
LYMPHOCYTES # BLD AUTO: 1.5 10*3/MM3 (ref 0.7–3.1)
LYMPHOCYTES NFR BLD AUTO: 23.5 % (ref 19.6–45.3)
MCH RBC QN AUTO: 29.5 PG (ref 26.6–33)
MCHC RBC AUTO-ENTMCNC: 30.5 G/DL (ref 31.5–35.7)
MCV RBC AUTO: 96.8 FL (ref 79–97)
MONOCYTES # BLD AUTO: 0.65 10*3/MM3 (ref 0.1–0.9)
MONOCYTES NFR BLD AUTO: 10.2 % (ref 5–12)
NEUTROPHILS # BLD AUTO: 3.9 10*3/MM3 (ref 1.4–7)
NEUTROPHILS NFR BLD AUTO: 61 % (ref 42.7–76)
NRBC BLD AUTO-RTO: 0 /100 WBC (ref 0–0)
PLATELET # BLD AUTO: 295 10*3/MM3 (ref 140–450)
POTASSIUM SERPL-SCNC: 5.1 MMOL/L (ref 3.5–5.2)
PROT SERPL-MCNC: 6.8 G/DL (ref 6–8.5)
RBC # BLD AUTO: 3.76 10*6/MM3 (ref 4.14–5.8)
SODIUM SERPL-SCNC: 137 MMOL/L (ref 136–145)
T4 FREE SERPL-MCNC: NORMAL NG/DL
TSH SERPL DL<=0.005 MIU/L-ACNC: 1.6 MIU/ML (ref 0.27–4.2)
WBC # BLD AUTO: 6.39 10*3/MM3 (ref 3.4–10.8)

## 2019-03-25 RX ORDER — PROMETHAZINE HYDROCHLORIDE 12.5 MG/1
6.25 TABLET ORAL 2 TIMES DAILY PRN
Qty: 30 TABLET | Refills: 0 | Status: SHIPPED | OUTPATIENT
Start: 2019-03-25

## 2019-03-27 NOTE — PROGRESS NOTES
Subjective   Maximo Gonzalez is a 89 y.o. male.     Needs new PCP  Essential hypertension controlled  Previous have problems labile were stable presently  No problems with low blood pressure recently  Previously would take clonidine 170    More swelling lower extremities the last several weeks without calf tenderness chest pain or shortness of breath  Denies history CHF  History of CAD stable without chest pain  Doppler study negative for in November  Takes Lasix as needed HCTZ  Hyperlipidemia  Mixed stable no medications    Here with family  Supportive  Rotate to help father          Hypertension   Pertinent negatives include no chest pain, palpitations or shortness of breath.        The following portions of the patient's history were reviewed and updated as appropriate: allergies, current medications, past family history, past medical history, past social history, past surgical history and problem list.    Review of Systems   Respiratory: Negative for shortness of breath.    Cardiovascular: Negative for chest pain and palpitations.   All other systems reviewed and are negative.      Objective   Physical Exam   Constitutional: He is oriented to person, place, and time. He appears well-developed and well-nourished. No distress.   No distress   HENT:   Head: Normocephalic and atraumatic.   Nose: Nose normal.   Mouth/Throat: Oropharynx is clear and moist.   Eyes: Conjunctivae are normal. Pupils are equal, round, and reactive to light.   Neck: Neck supple. No JVD present.   Cardiovascular: Normal rate, regular rhythm and normal heart sounds.   No murmur heard.  Pulmonary/Chest: Effort normal and breath sounds normal. No respiratory distress. He has no wheezes.   Unlabored   Abdominal: Soft. Bowel sounds are normal. He exhibits no distension and no mass. There is no tenderness. There is no guarding. No hernia.   Musculoskeletal: He exhibits no edema or tenderness.   No calf tenderness  2+ ankle edema tibial edema no  redness   Lymphadenopathy:     He has no cervical adenopathy.   Neurological: He is alert and oriented to person, place, and time.   Skin: Skin is warm and dry. He is not diaphoretic.   Psychiatric: He has a normal mood and affect. His behavior is normal. Judgment and thought content normal.   Vitals reviewed.        Assessment/Plan   Maximo was seen today for hypertension.    Diagnoses and all orders for this visit:    Essential hypertension  -     Comprehensive Metabolic Panel  -     TSH Rfx On Abnormal To Free T4  -     CBC & Differential    Swelling of both lower extremities  -     Comprehensive Metabolic Panel  -     TSH Rfx On Abnormal To Free T4  -     CBC & Differential    Hypotension due to hypovolemia  -     Comprehensive Metabolic Panel  -     TSH Rfx On Abnormal To Free T4  -     CBC & Differential    Other orders  -     benazepril (LOTENSIN) 20 MG tablet; Take 1 tablet by mouth Daily. For blood pressure  -     T4F                 Discharge instructions      Discontinue benazepril HCTZ  Starting tomorrow  Take benazepril 20 mg daily  Take Lasix 20 mg daily for blood pressure and edema control  Elevate legs whenever sitting if possible  Continue amlodipine labetalol hydralazine no changes here    Clonidine with caution  If blood pressure 170 or higher  And you feel okay  Do nothing different simply recheck in 1 hour  If still elevated take clonidine x1    Weakness chest pain shortness of breath syncope emergency room      Check blood pressure once a day  And record  Treat trends not isolated numbers  If greater than 170 follow instructions above    If dizziness check blood pressure  If low such as less than 100  Drink a couple extra glasses of water  And falls precaution    If prolonged low blood pressure go to the emergency room    Call blood pressure readings in 1 week    Call for appointment with cardiology  Discussed blood pressure control with cardiology and plan    Generally 150 range systolic or top  number  Is acceptable for long-term blood pressure management for your age                      Quite a bit of teaching

## 2019-04-03 DIAGNOSIS — E83.52 HYPERCALCEMIA: Primary | ICD-10-CM

## 2019-04-03 DIAGNOSIS — D64.9 ANEMIA, UNSPECIFIED TYPE: ICD-10-CM

## 2019-04-08 ENCOUNTER — RESULTS ENCOUNTER (OUTPATIENT)
Dept: FAMILY MEDICINE CLINIC | Facility: CLINIC | Age: 84
End: 2019-04-08

## 2019-04-08 DIAGNOSIS — D64.9 ANEMIA, UNSPECIFIED TYPE: ICD-10-CM

## 2019-04-08 DIAGNOSIS — E83.52 HYPERCALCEMIA: ICD-10-CM

## 2019-04-11 LAB
CALCIUM SERPL-MCNC: 10.2 MG/DL (ref 8.6–10.2)
FOLATE SERPL-MCNC: >20 NG/ML (ref 4.78–24.2)
INTACT PTH: NORMAL
IRON SATN MFR SERPL: 20 % (ref 20–50)
IRON SERPL-MCNC: 88 MCG/DL (ref 59–158)
PTH-INTACT SERPL-MCNC: 47 PG/ML (ref 15–65)
RETICS/RBC NFR AUTO: 1.29 % (ref 0.5–1.5)
TIBC SERPL-MCNC: 446 MCG/DL
UIBC SERPL-MCNC: 358 MCG/DL
VIT B12 SERPL-MCNC: 250 PG/ML (ref 211–946)

## 2019-04-18 ENCOUNTER — TELEPHONE (OUTPATIENT)
Dept: FAMILY MEDICINE CLINIC | Facility: CLINIC | Age: 84
End: 2019-04-18

## 2019-04-18 NOTE — TELEPHONE ENCOUNTER
Please tell patient B12 level is a little low    Take B12 1000 mcg daily    Iron levels are okay    Calcium was borderline high  Parathyroid hormone level borderline    We should repeat this in about 4 months or so    There were the also good thank you

## 2019-05-03 ENCOUNTER — RESULTS ENCOUNTER (OUTPATIENT)
Dept: FAMILY MEDICINE CLINIC | Facility: CLINIC | Age: 84
End: 2019-05-03

## 2019-05-03 DIAGNOSIS — D64.9 ANEMIA, UNSPECIFIED TYPE: ICD-10-CM

## 2019-05-03 DIAGNOSIS — E83.52 HYPERCALCEMIA: ICD-10-CM

## 2019-07-08 ENCOUNTER — TELEPHONE (OUTPATIENT)
Dept: FAMILY MEDICINE CLINIC | Facility: CLINIC | Age: 84
End: 2019-07-08

## 2019-07-08 DIAGNOSIS — R26.81 GAIT INSTABILITY: Primary | ICD-10-CM

## 2019-07-08 DIAGNOSIS — R29.898 MUSCULAR DECONDITIONING: ICD-10-CM

## 2019-07-08 DIAGNOSIS — R53.1 WEAKNESS: ICD-10-CM

## 2019-07-08 NOTE — TELEPHONE ENCOUNTER
Call from patient daughter Alexia Moctezuma. Stating Mr. Gonzalez is having trouble walking. She would like for him to have in home physical therapy.    Advise.

## 2019-07-08 NOTE — TELEPHONE ENCOUNTER
I entered an order for physical therapy  He should go ahead make an appointment to follow-up here as well      If he has any focal weakness confusion slurred speech etc. emergency room

## 2019-07-09 ENCOUNTER — TELEPHONE (OUTPATIENT)
Dept: FAMILY MEDICINE CLINIC | Facility: CLINIC | Age: 84
End: 2019-07-09

## 2019-07-09 NOTE — TELEPHONE ENCOUNTER
Call return to Alexia patient daughter. [facility] at patient daughter request.

## 2019-07-09 NOTE — TELEPHONE ENCOUNTER
Patients daughter called and would like for the him to be referred to [facility].   Phone: [phone]  Fax: [phone]    Please call daughter once order is put in.   Alexia   [phone]

## 2019-07-16 RX ORDER — HYDRALAZINE HYDROCHLORIDE 50 MG/1
TABLET, FILM COATED ORAL
Qty: 180 TABLET | Refills: 0 | OUTPATIENT
Start: 2019-07-16

## 2019-07-16 RX ORDER — HYDRALAZINE HYDROCHLORIDE 50 MG/1
TABLET, FILM COATED ORAL
Qty: 180 TABLET | Refills: 0 | Status: CANCELLED | OUTPATIENT
Start: 2019-07-16

## 2019-07-16 RX ORDER — HYDRALAZINE HYDROCHLORIDE 50 MG/1
50 TABLET, FILM COATED ORAL 2 TIMES DAILY
Qty: 180 TABLET | Refills: 5 | Status: SHIPPED | OUTPATIENT
Start: 2019-07-16

## 2019-10-18 RX ORDER — FUROSEMIDE 40 MG/1
TABLET ORAL
Qty: 90 TABLET | Refills: 0 | OUTPATIENT
Start: 2019-10-18

## 2020-01-13 RX ORDER — PROMETHAZINE HYDROCHLORIDE 12.5 MG/1
TABLET ORAL
Qty: 30 TABLET | Refills: 0 | OUTPATIENT
Start: 2020-01-13

## 2020-01-16 RX ORDER — PROMETHAZINE HYDROCHLORIDE 12.5 MG/1
TABLET ORAL
Qty: 30 TABLET | Refills: 0 | OUTPATIENT
Start: 2020-01-16

## 2020-10-12 RX ORDER — HYDRALAZINE HYDROCHLORIDE 50 MG/1
TABLET, FILM COATED ORAL
Qty: 180 TABLET | Refills: 0 | OUTPATIENT
Start: 2020-10-12

## 2023-01-01 ENCOUNTER — HOSPITAL ENCOUNTER (INPATIENT)
Facility: HOSPITAL | Age: 88
LOS: 5 days | DRG: 951 | End: 2023-02-06
Attending: STUDENT IN AN ORGANIZED HEALTH CARE EDUCATION/TRAINING PROGRAM | Admitting: STUDENT IN AN ORGANIZED HEALTH CARE EDUCATION/TRAINING PROGRAM
Payer: COMMERCIAL

## 2023-01-01 ENCOUNTER — APPOINTMENT (OUTPATIENT)
Dept: GENERAL RADIOLOGY | Facility: HOSPITAL | Age: 88
DRG: 282 | End: 2023-01-01
Payer: MEDICARE

## 2023-01-01 ENCOUNTER — HOSPITAL ENCOUNTER (INPATIENT)
Facility: HOSPITAL | Age: 88
LOS: 2 days | Discharge: HOSPICE | DRG: 282 | End: 2023-02-01
Attending: EMERGENCY MEDICINE | Admitting: INTERNAL MEDICINE
Payer: MEDICARE

## 2023-01-01 ENCOUNTER — APPOINTMENT (OUTPATIENT)
Dept: CT IMAGING | Facility: HOSPITAL | Age: 88
DRG: 282 | End: 2023-01-01
Payer: MEDICARE

## 2023-01-01 VITALS
BODY MASS INDEX: 21.53 KG/M2 | DIASTOLIC BLOOD PRESSURE: 79 MMHG | WEIGHT: 134 LBS | OXYGEN SATURATION: 95 % | HEART RATE: 81 BPM | RESPIRATION RATE: 16 BRPM | TEMPERATURE: 99 F | HEIGHT: 66 IN | SYSTOLIC BLOOD PRESSURE: 156 MMHG

## 2023-01-01 VITALS
RESPIRATION RATE: 12 BRPM | HEART RATE: 99 BPM | TEMPERATURE: 99.3 F | OXYGEN SATURATION: 93 % | SYSTOLIC BLOOD PRESSURE: 76 MMHG | DIASTOLIC BLOOD PRESSURE: 46 MMHG

## 2023-01-01 DIAGNOSIS — S00.83XA CONTUSION OF FOREHEAD, INITIAL ENCOUNTER: ICD-10-CM

## 2023-01-01 DIAGNOSIS — R74.8 ELEVATED CK: ICD-10-CM

## 2023-01-01 DIAGNOSIS — I21.3 ST ELEVATION MYOCARDIAL INFARCTION (STEMI), UNSPECIFIED ARTERY: Primary | ICD-10-CM

## 2023-01-01 DIAGNOSIS — N28.9 RENAL INSUFFICIENCY: ICD-10-CM

## 2023-01-01 DIAGNOSIS — Z86.79 HISTORY OF CORONARY ARTERY DISEASE: ICD-10-CM

## 2023-01-01 LAB
ALBUMIN SERPL-MCNC: 4.2 G/DL (ref 3.5–5.2)
ALBUMIN/GLOB SERPL: 2.1 G/DL
ALP SERPL-CCNC: 62 U/L (ref 39–117)
ALT SERPL W P-5'-P-CCNC: 54 U/L (ref 1–41)
ANION GAP SERPL CALCULATED.3IONS-SCNC: 13.7 MMOL/L (ref 5–15)
ANION GAP SERPL CALCULATED.3IONS-SCNC: 16.3 MMOL/L (ref 5–15)
AST SERPL-CCNC: 135 U/L (ref 1–40)
BASOPHILS # BLD AUTO: 0 10*3/MM3 (ref 0–0.2)
BASOPHILS # BLD AUTO: 0.02 10*3/MM3 (ref 0–0.2)
BASOPHILS NFR BLD AUTO: 0 % (ref 0–1.5)
BASOPHILS NFR BLD AUTO: 0.1 % (ref 0–1.5)
BILIRUB SERPL-MCNC: 0.3 MG/DL (ref 0–1.2)
BUN SERPL-MCNC: 86 MG/DL (ref 8–23)
BUN SERPL-MCNC: 95 MG/DL (ref 8–23)
BUN/CREAT SERPL: 36.1 (ref 7–25)
BUN/CREAT SERPL: 38.2 (ref 7–25)
CALCIUM SPEC-SCNC: 10 MG/DL (ref 8.2–9.6)
CALCIUM SPEC-SCNC: 9.8 MG/DL (ref 8.2–9.6)
CHLORIDE SERPL-SCNC: 97 MMOL/L (ref 98–107)
CHLORIDE SERPL-SCNC: 99 MMOL/L (ref 98–107)
CK SERPL-CCNC: 2612 U/L (ref 20–200)
CO2 SERPL-SCNC: 20.7 MMOL/L (ref 22–29)
CO2 SERPL-SCNC: 21.3 MMOL/L (ref 22–29)
CREAT SERPL-MCNC: 2.25 MG/DL (ref 0.76–1.27)
CREAT SERPL-MCNC: 2.63 MG/DL (ref 0.76–1.27)
DEPRECATED RDW RBC AUTO: 39.5 FL (ref 37–54)
DEPRECATED RDW RBC AUTO: 42.4 FL (ref 37–54)
EGFRCR SERPLBLD CKD-EPI 2021: 22.1 ML/MIN/1.73
EGFRCR SERPLBLD CKD-EPI 2021: 26.7 ML/MIN/1.73
EOSINOPHIL # BLD AUTO: 0 10*3/MM3 (ref 0–0.4)
EOSINOPHIL # BLD AUTO: 0 10*3/MM3 (ref 0–0.4)
EOSINOPHIL NFR BLD AUTO: 0 % (ref 0.3–6.2)
EOSINOPHIL NFR BLD AUTO: 0 % (ref 0.3–6.2)
ERYTHROCYTE [DISTWIDTH] IN BLOOD BY AUTOMATED COUNT: 12.1 % (ref 12.3–15.4)
ERYTHROCYTE [DISTWIDTH] IN BLOOD BY AUTOMATED COUNT: 12.4 % (ref 12.3–15.4)
GLOBULIN UR ELPH-MCNC: 2 GM/DL
GLUCOSE BLDC GLUCOMTR-MCNC: 171 MG/DL (ref 70–130)
GLUCOSE SERPL-MCNC: 124 MG/DL (ref 65–99)
GLUCOSE SERPL-MCNC: 173 MG/DL (ref 65–99)
HCT VFR BLD AUTO: 32 % (ref 37.5–51)
HCT VFR BLD AUTO: 32.7 % (ref 37.5–51)
HGB BLD-MCNC: 10.2 G/DL (ref 13–17.7)
HGB BLD-MCNC: 10.8 G/DL (ref 13–17.7)
IMM GRANULOCYTES # BLD AUTO: 0.05 10*3/MM3 (ref 0–0.05)
IMM GRANULOCYTES # BLD AUTO: 0.05 10*3/MM3 (ref 0–0.05)
IMM GRANULOCYTES NFR BLD AUTO: 0.4 % (ref 0–0.5)
IMM GRANULOCYTES NFR BLD AUTO: 0.5 % (ref 0–0.5)
INR PPP: 0.93 (ref 0.9–1.1)
LYMPHOCYTES # BLD AUTO: 0.36 10*3/MM3 (ref 0.7–3.1)
LYMPHOCYTES # BLD AUTO: 0.85 10*3/MM3 (ref 0.7–3.1)
LYMPHOCYTES NFR BLD AUTO: 2.6 % (ref 19.6–45.3)
LYMPHOCYTES NFR BLD AUTO: 8.2 % (ref 19.6–45.3)
MCH RBC QN AUTO: 29.4 PG (ref 26.6–33)
MCH RBC QN AUTO: 29.6 PG (ref 26.6–33)
MCHC RBC AUTO-ENTMCNC: 31.9 G/DL (ref 31.5–35.7)
MCHC RBC AUTO-ENTMCNC: 33 G/DL (ref 31.5–35.7)
MCV RBC AUTO: 89.1 FL (ref 79–97)
MCV RBC AUTO: 92.8 FL (ref 79–97)
MONOCYTES # BLD AUTO: 0.86 10*3/MM3 (ref 0.1–0.9)
MONOCYTES # BLD AUTO: 0.87 10*3/MM3 (ref 0.1–0.9)
MONOCYTES NFR BLD AUTO: 6.3 % (ref 5–12)
MONOCYTES NFR BLD AUTO: 8.3 % (ref 5–12)
NEUTROPHILS NFR BLD AUTO: 12.46 10*3/MM3 (ref 1.7–7)
NEUTROPHILS NFR BLD AUTO: 8.55 10*3/MM3 (ref 1.7–7)
NEUTROPHILS NFR BLD AUTO: 83 % (ref 42.7–76)
NEUTROPHILS NFR BLD AUTO: 90.6 % (ref 42.7–76)
NRBC BLD AUTO-RTO: 0 /100 WBC (ref 0–0.2)
NRBC BLD AUTO-RTO: 0 /100 WBC (ref 0–0.2)
PLATELET # BLD AUTO: 239 10*3/MM3 (ref 140–450)
PLATELET # BLD AUTO: 251 10*3/MM3 (ref 140–450)
PMV BLD AUTO: 9.7 FL (ref 6–12)
PMV BLD AUTO: 9.7 FL (ref 6–12)
POTASSIUM SERPL-SCNC: 4.3 MMOL/L (ref 3.5–5.2)
POTASSIUM SERPL-SCNC: 4.5 MMOL/L (ref 3.5–5.2)
PROT SERPL-MCNC: 6.2 G/DL (ref 6–8.5)
PROTHROMBIN TIME: 12.6 SECONDS (ref 11.7–14.2)
QT INTERVAL: 403 MS
RBC # BLD AUTO: 3.45 10*6/MM3 (ref 4.14–5.8)
RBC # BLD AUTO: 3.67 10*6/MM3 (ref 4.14–5.8)
SODIUM SERPL-SCNC: 132 MMOL/L (ref 136–145)
SODIUM SERPL-SCNC: 136 MMOL/L (ref 136–145)
TROPONIN T SERPL-MCNC: 3.31 NG/ML (ref 0–0.03)
WBC NRBC COR # BLD: 10.31 10*3/MM3 (ref 3.4–10.8)
WBC NRBC COR # BLD: 13.76 10*3/MM3 (ref 3.4–10.8)

## 2023-01-01 PROCEDURE — 99284 EMERGENCY DEPT VISIT MOD MDM: CPT

## 2023-01-01 PROCEDURE — 36415 COLL VENOUS BLD VENIPUNCTURE: CPT | Performed by: INTERNAL MEDICINE

## 2023-01-01 PROCEDURE — 82962 GLUCOSE BLOOD TEST: CPT

## 2023-01-01 PROCEDURE — 93005 ELECTROCARDIOGRAM TRACING: CPT | Performed by: EMERGENCY MEDICINE

## 2023-01-01 PROCEDURE — 25010000002 LORAZEPAM PER 2 MG: Performed by: STUDENT IN AN ORGANIZED HEALTH CARE EDUCATION/TRAINING PROGRAM

## 2023-01-01 PROCEDURE — 25010000002 HYDROMORPHONE 1 MG/ML SOLUTION: Performed by: INTERNAL MEDICINE

## 2023-01-01 PROCEDURE — 25010000002 HYDROMORPHONE PER 4 MG: Performed by: STUDENT IN AN ORGANIZED HEALTH CARE EDUCATION/TRAINING PROGRAM

## 2023-01-01 PROCEDURE — 82550 ASSAY OF CK (CPK): CPT | Performed by: EMERGENCY MEDICINE

## 2023-01-01 PROCEDURE — 70450 CT HEAD/BRAIN W/O DYE: CPT

## 2023-01-01 PROCEDURE — 99285 EMERGENCY DEPT VISIT HI MDM: CPT

## 2023-01-01 PROCEDURE — 71045 X-RAY EXAM CHEST 1 VIEW: CPT

## 2023-01-01 PROCEDURE — 25010000002 MORPHINE PER 10 MG: Performed by: INTERNAL MEDICINE

## 2023-01-01 PROCEDURE — 85610 PROTHROMBIN TIME: CPT | Performed by: EMERGENCY MEDICINE

## 2023-01-01 PROCEDURE — 80048 BASIC METABOLIC PNL TOTAL CA: CPT | Performed by: INTERNAL MEDICINE

## 2023-01-01 PROCEDURE — 25010000002 LORAZEPAM PER 2 MG: Performed by: INTERNAL MEDICINE

## 2023-01-01 PROCEDURE — 80053 COMPREHEN METABOLIC PANEL: CPT | Performed by: EMERGENCY MEDICINE

## 2023-01-01 PROCEDURE — 85025 COMPLETE CBC W/AUTO DIFF WBC: CPT | Performed by: INTERNAL MEDICINE

## 2023-01-01 PROCEDURE — 85025 COMPLETE CBC W/AUTO DIFF WBC: CPT | Performed by: EMERGENCY MEDICINE

## 2023-01-01 PROCEDURE — 84484 ASSAY OF TROPONIN QUANT: CPT | Performed by: EMERGENCY MEDICINE

## 2023-01-01 PROCEDURE — 93010 ELECTROCARDIOGRAM REPORT: CPT | Performed by: INTERNAL MEDICINE

## 2023-01-01 RX ORDER — DIPHENOXYLATE HYDROCHLORIDE AND ATROPINE SULFATE 2.5; .025 MG/1; MG/1
1 TABLET ORAL
Status: CANCELLED | OUTPATIENT
Start: 2023-01-01

## 2023-01-01 RX ORDER — ONDANSETRON 2 MG/ML
4 INJECTION INTRAMUSCULAR; INTRAVENOUS EVERY 6 HOURS PRN
Status: DISCONTINUED | OUTPATIENT
Start: 2023-01-01 | End: 2023-01-01 | Stop reason: HOSPADM

## 2023-01-01 RX ORDER — ACETAMINOPHEN 325 MG/1
650 TABLET ORAL EVERY 4 HOURS PRN
Status: DISCONTINUED | OUTPATIENT
Start: 2023-01-01 | End: 2023-01-01 | Stop reason: HOSPADM

## 2023-01-01 RX ORDER — MORPHINE SULFATE 4 MG/ML
4 INJECTION, SOLUTION INTRAMUSCULAR; INTRAVENOUS
Status: DISCONTINUED | OUTPATIENT
Start: 2023-01-01 | End: 2023-01-01 | Stop reason: HOSPADM

## 2023-01-01 RX ORDER — MORPHINE SULFATE 20 MG/ML
10 SOLUTION ORAL
Status: DISCONTINUED | OUTPATIENT
Start: 2023-01-01 | End: 2023-01-01 | Stop reason: HOSPADM

## 2023-01-01 RX ORDER — HYDROMORPHONE HCL 110MG/55ML
1.5 PATIENT CONTROLLED ANALGESIA SYRINGE INTRAVENOUS
Status: DISCONTINUED | OUTPATIENT
Start: 2023-01-01 | End: 2023-01-01 | Stop reason: HOSPADM

## 2023-01-01 RX ORDER — LORAZEPAM 2 MG/ML
0.5 CONCENTRATE ORAL
Status: CANCELLED | OUTPATIENT
Start: 2023-01-01 | End: 2023-01-01

## 2023-01-01 RX ORDER — HYDRALAZINE HYDROCHLORIDE 50 MG/1
50 TABLET, FILM COATED ORAL 2 TIMES DAILY
Status: DISCONTINUED | OUTPATIENT
Start: 2023-01-01 | End: 2023-01-01 | Stop reason: HOSPADM

## 2023-01-01 RX ORDER — MORPHINE SULFATE 20 MG/ML
10 SOLUTION ORAL
Status: CANCELLED | OUTPATIENT
Start: 2023-01-01 | End: 2023-01-01

## 2023-01-01 RX ORDER — SIMETHICONE 80 MG
80 TABLET,CHEWABLE ORAL 4 TIMES DAILY PRN
Status: DISCONTINUED | OUTPATIENT
Start: 2023-01-01 | End: 2023-01-01 | Stop reason: HOSPADM

## 2023-01-01 RX ORDER — LORAZEPAM 2 MG/ML
0.5 CONCENTRATE ORAL
Status: DISCONTINUED | OUTPATIENT
Start: 2023-01-01 | End: 2023-01-01 | Stop reason: HOSPADM

## 2023-01-01 RX ORDER — LORAZEPAM 2 MG/ML
0.5 INJECTION INTRAMUSCULAR
Status: DISCONTINUED | OUTPATIENT
Start: 2023-01-01 | End: 2023-01-01 | Stop reason: HOSPADM

## 2023-01-01 RX ORDER — LORAZEPAM 2 MG/ML
1 CONCENTRATE ORAL
Status: CANCELLED | OUTPATIENT
Start: 2023-01-01 | End: 2023-01-01

## 2023-01-01 RX ORDER — LORAZEPAM 2 MG/ML
2 INJECTION INTRAMUSCULAR
Status: CANCELLED | OUTPATIENT
Start: 2023-01-01 | End: 2023-01-01

## 2023-01-01 RX ORDER — ONDANSETRON 2 MG/ML
4 INJECTION INTRAMUSCULAR; INTRAVENOUS EVERY 6 HOURS PRN
Status: CANCELLED | OUTPATIENT
Start: 2023-01-01

## 2023-01-01 RX ORDER — ACETAMINOPHEN 160 MG/5ML
650 SOLUTION ORAL EVERY 4 HOURS PRN
Status: DISCONTINUED | OUTPATIENT
Start: 2023-01-01 | End: 2023-01-01 | Stop reason: HOSPADM

## 2023-01-01 RX ORDER — LORAZEPAM 2 MG/ML
1 INJECTION INTRAMUSCULAR
Status: DISCONTINUED | OUTPATIENT
Start: 2023-01-01 | End: 2023-01-01 | Stop reason: HOSPADM

## 2023-01-01 RX ORDER — SIMETHICONE 80 MG
80 TABLET,CHEWABLE ORAL 4 TIMES DAILY PRN
Status: CANCELLED | OUTPATIENT
Start: 2023-01-01

## 2023-01-01 RX ORDER — SODIUM CHLORIDE 0.9 % (FLUSH) 0.9 %
10 SYRINGE (ML) INJECTION EVERY 12 HOURS SCHEDULED
Status: DISCONTINUED | OUTPATIENT
Start: 2023-01-01 | End: 2023-01-01 | Stop reason: HOSPADM

## 2023-01-01 RX ORDER — LORAZEPAM 2 MG/ML
1 CONCENTRATE ORAL
Status: DISCONTINUED | OUTPATIENT
Start: 2023-01-01 | End: 2023-01-01 | Stop reason: HOSPADM

## 2023-01-01 RX ORDER — DIPHENOXYLATE HYDROCHLORIDE AND ATROPINE SULFATE 2.5; .025 MG/1; MG/1
1 TABLET ORAL
Status: DISCONTINUED | OUTPATIENT
Start: 2023-01-01 | End: 2023-01-01 | Stop reason: HOSPADM

## 2023-01-01 RX ORDER — LORAZEPAM 2 MG/ML
2 CONCENTRATE ORAL
Status: CANCELLED | OUTPATIENT
Start: 2023-01-01 | End: 2023-01-01

## 2023-01-01 RX ORDER — LORAZEPAM 2 MG/ML
2 INJECTION INTRAMUSCULAR
Status: DISCONTINUED | OUTPATIENT
Start: 2023-01-01 | End: 2023-01-01 | Stop reason: HOSPADM

## 2023-01-01 RX ORDER — HYDROMORPHONE HCL 110MG/55ML
2 PATIENT CONTROLLED ANALGESIA SYRINGE INTRAVENOUS
Status: DISCONTINUED | OUTPATIENT
Start: 2023-01-01 | End: 2023-01-01 | Stop reason: HOSPADM

## 2023-01-01 RX ORDER — LORAZEPAM 2 MG/ML
1 INJECTION INTRAMUSCULAR
Status: CANCELLED | OUTPATIENT
Start: 2023-01-01 | End: 2023-01-01

## 2023-01-01 RX ORDER — LORAZEPAM 2 MG/ML
2 CONCENTRATE ORAL
Status: DISCONTINUED | OUTPATIENT
Start: 2023-01-01 | End: 2023-01-01 | Stop reason: HOSPADM

## 2023-01-01 RX ORDER — LORAZEPAM 2 MG/ML
0.5 INJECTION INTRAMUSCULAR
Status: CANCELLED | OUTPATIENT
Start: 2023-01-01 | End: 2023-01-01

## 2023-01-01 RX ORDER — ACETAMINOPHEN 160 MG/5ML
650 SOLUTION ORAL EVERY 4 HOURS PRN
Status: CANCELLED | OUTPATIENT
Start: 2023-01-01

## 2023-01-01 RX ORDER — LISINOPRIL 20 MG/1
20 TABLET ORAL
Status: DISCONTINUED | OUTPATIENT
Start: 2023-01-01 | End: 2023-01-01 | Stop reason: HOSPADM

## 2023-01-01 RX ORDER — MORPHINE SULFATE 2 MG/ML
2 INJECTION, SOLUTION INTRAMUSCULAR; INTRAVENOUS
Status: DISCONTINUED | OUTPATIENT
Start: 2023-01-01 | End: 2023-01-01

## 2023-01-01 RX ORDER — ACETAMINOPHEN 650 MG/1
650 SUPPOSITORY RECTAL EVERY 4 HOURS PRN
Status: DISCONTINUED | OUTPATIENT
Start: 2023-01-01 | End: 2023-01-01 | Stop reason: HOSPADM

## 2023-01-01 RX ORDER — ACETAMINOPHEN 650 MG/1
650 SUPPOSITORY RECTAL EVERY 4 HOURS PRN
Status: CANCELLED | OUTPATIENT
Start: 2023-01-01

## 2023-01-01 RX ORDER — LABETALOL 200 MG/1
200 TABLET, FILM COATED ORAL DAILY
Status: DISCONTINUED | OUTPATIENT
Start: 2023-01-01 | End: 2023-01-01 | Stop reason: HOSPADM

## 2023-01-01 RX ORDER — MORPHINE SULFATE 4 MG/ML
4 INJECTION, SOLUTION INTRAMUSCULAR; INTRAVENOUS
Status: CANCELLED | OUTPATIENT
Start: 2023-01-01 | End: 2023-01-01

## 2023-01-01 RX ORDER — FUROSEMIDE 40 MG/1
40 TABLET ORAL DAILY
COMMUNITY

## 2023-01-01 RX ORDER — ASPIRIN 81 MG/1
81 TABLET ORAL DAILY
Status: DISCONTINUED | OUTPATIENT
Start: 2023-01-01 | End: 2023-01-01 | Stop reason: HOSPADM

## 2023-01-01 RX ORDER — ACETAMINOPHEN 325 MG/1
650 TABLET ORAL EVERY 4 HOURS PRN
Status: CANCELLED | OUTPATIENT
Start: 2023-01-01

## 2023-01-01 RX ORDER — SODIUM CHLORIDE 0.9 % (FLUSH) 0.9 %
10 SYRINGE (ML) INJECTION AS NEEDED
Status: DISCONTINUED | OUTPATIENT
Start: 2023-01-01 | End: 2023-01-01 | Stop reason: HOSPADM

## 2023-01-01 RX ADMIN — LORAZEPAM 1 MG: 2 INJECTION INTRAMUSCULAR; INTRAVENOUS at 21:51

## 2023-01-01 RX ADMIN — LORAZEPAM 2 MG: 2 INJECTION INTRAMUSCULAR; INTRAVENOUS at 20:24

## 2023-01-01 RX ADMIN — HYDROMORPHONE HYDROCHLORIDE 2 MG: 2 INJECTION, SOLUTION INTRAMUSCULAR; INTRAVENOUS; SUBCUTANEOUS at 21:03

## 2023-01-01 RX ADMIN — LORAZEPAM 1 MG: 2 INJECTION INTRAMUSCULAR; INTRAVENOUS at 00:59

## 2023-01-01 RX ADMIN — LORAZEPAM 2 MG: 2 INJECTION INTRAMUSCULAR; INTRAVENOUS at 17:12

## 2023-01-01 RX ADMIN — LORAZEPAM 1 MG: 2 INJECTION INTRAMUSCULAR; INTRAVENOUS at 04:56

## 2023-01-01 RX ADMIN — Medication 10 ML: at 09:22

## 2023-01-01 RX ADMIN — LORAZEPAM 2 MG: 2 INJECTION INTRAMUSCULAR; INTRAVENOUS at 08:56

## 2023-01-01 RX ADMIN — HYDROMORPHONE HYDROCHLORIDE 2 MG: 2 INJECTION, SOLUTION INTRAMUSCULAR; INTRAVENOUS; SUBCUTANEOUS at 09:22

## 2023-01-01 RX ADMIN — HYDROMORPHONE HYDROCHLORIDE 2 MG: 2 INJECTION, SOLUTION INTRAMUSCULAR; INTRAVENOUS; SUBCUTANEOUS at 17:12

## 2023-01-01 RX ADMIN — HYDROMORPHONE HYDROCHLORIDE 2 MG: 2 INJECTION, SOLUTION INTRAMUSCULAR; INTRAVENOUS; SUBCUTANEOUS at 05:38

## 2023-01-01 RX ADMIN — HYDROMORPHONE HYDROCHLORIDE 1 MG: 1 INJECTION, SOLUTION INTRAMUSCULAR; INTRAVENOUS; SUBCUTANEOUS at 05:14

## 2023-01-01 RX ADMIN — HYDROMORPHONE HYDROCHLORIDE 2 MG: 2 INJECTION, SOLUTION INTRAMUSCULAR; INTRAVENOUS; SUBCUTANEOUS at 04:56

## 2023-01-01 RX ADMIN — HYDROMORPHONE HYDROCHLORIDE 2 MG: 2 INJECTION, SOLUTION INTRAMUSCULAR; INTRAVENOUS; SUBCUTANEOUS at 09:20

## 2023-01-01 RX ADMIN — Medication 10 ML: at 16:47

## 2023-01-01 RX ADMIN — HYDROMORPHONE HYDROCHLORIDE 2 MG: 2 INJECTION, SOLUTION INTRAMUSCULAR; INTRAVENOUS; SUBCUTANEOUS at 11:48

## 2023-01-01 RX ADMIN — HYDROMORPHONE HYDROCHLORIDE 2 MG: 2 INJECTION, SOLUTION INTRAMUSCULAR; INTRAVENOUS; SUBCUTANEOUS at 01:00

## 2023-01-01 RX ADMIN — LORAZEPAM 2 MG: 2 INJECTION INTRAMUSCULAR; INTRAVENOUS at 00:44

## 2023-01-01 RX ADMIN — LORAZEPAM 2 MG: 2 INJECTION INTRAMUSCULAR; INTRAVENOUS at 00:46

## 2023-01-01 RX ADMIN — HYDROMORPHONE HYDROCHLORIDE 2 MG: 2 INJECTION, SOLUTION INTRAMUSCULAR; INTRAVENOUS; SUBCUTANEOUS at 00:56

## 2023-01-01 RX ADMIN — LORAZEPAM 2 MG: 2 INJECTION INTRAMUSCULAR; INTRAVENOUS at 17:22

## 2023-01-01 RX ADMIN — LORAZEPAM 2 MG: 2 INJECTION INTRAMUSCULAR; INTRAVENOUS at 05:37

## 2023-01-01 RX ADMIN — LORAZEPAM 1 MG: 2 INJECTION INTRAMUSCULAR; INTRAVENOUS at 05:14

## 2023-01-01 RX ADMIN — HYDROMORPHONE HYDROCHLORIDE 2 MG: 2 INJECTION, SOLUTION INTRAMUSCULAR; INTRAVENOUS; SUBCUTANEOUS at 20:41

## 2023-01-01 RX ADMIN — HYDROMORPHONE HYDROCHLORIDE 1 MG: 1 INJECTION, SOLUTION INTRAMUSCULAR; INTRAVENOUS; SUBCUTANEOUS at 06:33

## 2023-01-01 RX ADMIN — HYDROMORPHONE HYDROCHLORIDE 2 MG: 2 INJECTION, SOLUTION INTRAMUSCULAR; INTRAVENOUS; SUBCUTANEOUS at 21:01

## 2023-01-01 RX ADMIN — HYDROMORPHONE HYDROCHLORIDE 2 MG: 2 INJECTION, SOLUTION INTRAMUSCULAR; INTRAVENOUS; SUBCUTANEOUS at 17:21

## 2023-01-01 RX ADMIN — LORAZEPAM 2 MG: 2 INJECTION INTRAMUSCULAR; INTRAVENOUS at 09:14

## 2023-01-01 RX ADMIN — LORAZEPAM 0.5 MG: 2 INJECTION INTRAMUSCULAR; INTRAVENOUS at 22:16

## 2023-01-01 RX ADMIN — HYDROMORPHONE HYDROCHLORIDE 1 MG: 1 INJECTION, SOLUTION INTRAMUSCULAR; INTRAVENOUS; SUBCUTANEOUS at 18:52

## 2023-01-01 RX ADMIN — HYDROMORPHONE HYDROCHLORIDE 2 MG: 2 INJECTION, SOLUTION INTRAMUSCULAR; INTRAVENOUS; SUBCUTANEOUS at 13:36

## 2023-01-01 RX ADMIN — LORAZEPAM 2 MG: 2 INJECTION INTRAMUSCULAR; INTRAVENOUS at 04:57

## 2023-01-01 RX ADMIN — HYDROMORPHONE HYDROCHLORIDE 2 MG: 2 INJECTION, SOLUTION INTRAMUSCULAR; INTRAVENOUS; SUBCUTANEOUS at 12:24

## 2023-01-01 RX ADMIN — HYDROMORPHONE HYDROCHLORIDE 2 MG: 2 INJECTION, SOLUTION INTRAMUSCULAR; INTRAVENOUS; SUBCUTANEOUS at 12:44

## 2023-01-01 RX ADMIN — HYDROMORPHONE HYDROCHLORIDE 1 MG: 1 INJECTION, SOLUTION INTRAMUSCULAR; INTRAVENOUS; SUBCUTANEOUS at 04:44

## 2023-01-01 RX ADMIN — WHITE PETROLATUM 57.7 %-MINERAL OIL 31.9 % EYE OINTMENT: at 17:13

## 2023-01-01 RX ADMIN — LORAZEPAM 0.5 MG: 2 INJECTION INTRAMUSCULAR; INTRAVENOUS at 06:33

## 2023-01-01 RX ADMIN — MORPHINE SULFATE 2 MG: 2 INJECTION, SOLUTION INTRAMUSCULAR; INTRAVENOUS at 18:37

## 2023-01-01 RX ADMIN — LORAZEPAM 2 MG: 2 INJECTION INTRAMUSCULAR; INTRAVENOUS at 20:41

## 2023-01-01 RX ADMIN — HYDROMORPHONE HYDROCHLORIDE 2 MG: 2 INJECTION, SOLUTION INTRAMUSCULAR; INTRAVENOUS; SUBCUTANEOUS at 20:23

## 2023-01-01 RX ADMIN — LORAZEPAM 1 MG: 2 INJECTION INTRAMUSCULAR; INTRAVENOUS at 19:03

## 2023-01-01 RX ADMIN — LORAZEPAM 2 MG: 2 INJECTION INTRAMUSCULAR; INTRAVENOUS at 11:48

## 2023-01-01 RX ADMIN — WHITE PETROLATUM 57.7 %-MINERAL OIL 31.9 % EYE OINTMENT: at 12:40

## 2023-01-01 RX ADMIN — HYDROMORPHONE HYDROCHLORIDE 2 MG: 2 INJECTION, SOLUTION INTRAMUSCULAR; INTRAVENOUS; SUBCUTANEOUS at 05:16

## 2023-01-01 RX ADMIN — LORAZEPAM 2 MG: 2 INJECTION INTRAMUSCULAR; INTRAVENOUS at 09:21

## 2023-01-01 RX ADMIN — WHITE PETROLATUM 57.7 %-MINERAL OIL 31.9 % EYE OINTMENT: at 22:43

## 2023-01-01 RX ADMIN — LORAZEPAM 1 MG: 2 INJECTION INTRAMUSCULAR; INTRAVENOUS at 21:16

## 2023-01-01 RX ADMIN — LORAZEPAM 2 MG: 2 INJECTION INTRAMUSCULAR; INTRAVENOUS at 05:16

## 2023-01-01 RX ADMIN — LORAZEPAM 1 MG: 2 INJECTION INTRAMUSCULAR; INTRAVENOUS at 09:20

## 2023-01-01 RX ADMIN — WHITE PETROLATUM 57.7 %-MINERAL OIL 31.9 % EYE OINTMENT: at 16:47

## 2023-01-01 RX ADMIN — SIMETHICONE 80 MG: 80 TABLET, CHEWABLE ORAL at 19:18

## 2023-01-01 RX ADMIN — WHITE PETROLATUM 57.7 %-MINERAL OIL 31.9 % EYE OINTMENT: at 12:25

## 2023-01-01 RX ADMIN — LORAZEPAM 2 MG: 2 INJECTION INTRAMUSCULAR; INTRAVENOUS at 16:47

## 2023-01-01 RX ADMIN — HYDROMORPHONE HYDROCHLORIDE 2 MG: 2 INJECTION, SOLUTION INTRAMUSCULAR; INTRAVENOUS; SUBCUTANEOUS at 16:56

## 2023-01-01 RX ADMIN — WHITE PETROLATUM 57.7 %-MINERAL OIL 31.9 % EYE OINTMENT: at 00:45

## 2023-01-01 RX ADMIN — HYDROMORPHONE HYDROCHLORIDE 2 MG: 2 INJECTION, SOLUTION INTRAMUSCULAR; INTRAVENOUS; SUBCUTANEOUS at 08:56

## 2023-01-01 RX ADMIN — HYDROMORPHONE HYDROCHLORIDE 2 MG: 2 INJECTION, SOLUTION INTRAMUSCULAR; INTRAVENOUS; SUBCUTANEOUS at 00:47

## 2023-01-01 RX ADMIN — LORAZEPAM 2 MG: 2 INJECTION INTRAMUSCULAR; INTRAVENOUS at 21:02

## 2023-01-01 RX ADMIN — HYDROMORPHONE HYDROCHLORIDE 2 MG: 2 INJECTION, SOLUTION INTRAMUSCULAR; INTRAVENOUS; SUBCUTANEOUS at 16:47

## 2023-01-01 RX ADMIN — HYDROMORPHONE HYDROCHLORIDE 1 MG: 1 INJECTION, SOLUTION INTRAMUSCULAR; INTRAVENOUS; SUBCUTANEOUS at 10:21

## 2023-01-01 RX ADMIN — Medication 10 ML: at 09:15

## 2023-01-01 RX ADMIN — HYDROMORPHONE HYDROCHLORIDE 1 MG: 1 INJECTION, SOLUTION INTRAMUSCULAR; INTRAVENOUS; SUBCUTANEOUS at 09:05

## 2023-01-01 RX ADMIN — HYDROMORPHONE HYDROCHLORIDE 2 MG: 2 INJECTION, SOLUTION INTRAMUSCULAR; INTRAVENOUS; SUBCUTANEOUS at 00:44

## 2023-01-01 RX ADMIN — HYDROMORPHONE HYDROCHLORIDE 2 MG: 2 INJECTION, SOLUTION INTRAMUSCULAR; INTRAVENOUS; SUBCUTANEOUS at 04:57

## 2023-01-01 RX ADMIN — LORAZEPAM 2 MG: 2 INJECTION INTRAMUSCULAR; INTRAVENOUS at 12:41

## 2023-01-01 RX ADMIN — LORAZEPAM 2 MG: 2 INJECTION INTRAMUSCULAR; INTRAVENOUS at 12:44

## 2023-01-01 RX ADMIN — WHITE PETROLATUM 57.7 %-MINERAL OIL 31.9 % EYE OINTMENT: at 09:22

## 2023-01-01 RX ADMIN — SODIUM CHLORIDE 500 ML: 9 INJECTION, SOLUTION INTRAVENOUS at 16:30

## 2023-01-01 RX ADMIN — LORAZEPAM 2 MG: 2 INJECTION INTRAMUSCULAR; INTRAVENOUS at 12:25

## 2023-01-01 RX ADMIN — HYDROMORPHONE HYDROCHLORIDE 2 MG: 2 INJECTION, SOLUTION INTRAMUSCULAR; INTRAVENOUS; SUBCUTANEOUS at 18:58

## 2023-01-01 RX ADMIN — LORAZEPAM 2 MG: 2 INJECTION INTRAMUSCULAR; INTRAVENOUS at 00:56

## 2023-01-01 RX ADMIN — HYDROMORPHONE HYDROCHLORIDE 1 MG: 1 INJECTION, SOLUTION INTRAMUSCULAR; INTRAVENOUS; SUBCUTANEOUS at 21:10

## 2023-01-01 RX ADMIN — Medication 10 ML: at 17:14

## 2023-01-01 RX ADMIN — LORAZEPAM 1 MG: 2 INJECTION INTRAMUSCULAR; INTRAVENOUS at 09:05

## 2023-01-01 RX ADMIN — HYDROMORPHONE HYDROCHLORIDE 1 MG: 1 INJECTION, SOLUTION INTRAMUSCULAR; INTRAVENOUS; SUBCUTANEOUS at 12:46

## 2023-01-01 RX ADMIN — HYDROMORPHONE HYDROCHLORIDE 2 MG: 2 INJECTION, SOLUTION INTRAMUSCULAR; INTRAVENOUS; SUBCUTANEOUS at 21:16

## 2023-01-01 RX ADMIN — HYDROMORPHONE HYDROCHLORIDE 2 MG: 2 INJECTION, SOLUTION INTRAMUSCULAR; INTRAVENOUS; SUBCUTANEOUS at 12:41

## 2023-01-01 RX ADMIN — HYDROMORPHONE HYDROCHLORIDE 1 MG: 1 INJECTION, SOLUTION INTRAMUSCULAR; INTRAVENOUS; SUBCUTANEOUS at 20:42

## 2023-01-01 RX ADMIN — Medication 10 ML: at 22:42

## 2023-01-01 RX ADMIN — LORAZEPAM 2 MG: 2 INJECTION INTRAMUSCULAR; INTRAVENOUS at 16:55

## 2023-01-01 RX ADMIN — Medication 10 ML: at 21:03

## 2023-01-01 RX ADMIN — HYDROMORPHONE HYDROCHLORIDE 2 MG: 2 INJECTION, SOLUTION INTRAMUSCULAR; INTRAVENOUS; SUBCUTANEOUS at 09:15

## 2023-01-01 RX ADMIN — WHITE PETROLATUM 57.7 %-MINERAL OIL 31.9 % EYE OINTMENT: at 05:17

## 2023-01-01 RX ADMIN — HYDROMORPHONE HYDROCHLORIDE 1 MG: 1 INJECTION, SOLUTION INTRAMUSCULAR; INTRAVENOUS; SUBCUTANEOUS at 15:08

## 2023-01-30 PROBLEM — I21.3 ST ELEVATION MYOCARDIAL INFARCTION (STEMI), UNSPECIFIED ARTERY (HCC): Status: ACTIVE | Noted: 2023-01-01

## 2023-02-01 PROBLEM — Z51.5 END OF LIFE CARE: Status: ACTIVE | Noted: 2023-01-01

## 2023-02-01 NOTE — PROGRESS NOTES
Case Management Discharge Note      Final Note: admitted to a [facility] scattered bed today. ALDA Miller RN CCP.         Selected Continued Care - Admitted Since 1/30/2023     Destination Coordination complete.    Service Provider Selected Services Address Phone Fax Patient Preferred    [facility] [address], [facility] [id number] [phone] --          Durable Medical Equipment    No services have been selected for the patient.              Dialysis/Infusion    No services have been selected for the patient.              Home Medical Care    No services have been selected for the patient.              Therapy    No services have been selected for the patient.              Community Resources    No services have been selected for the patient.              Community & DME    No services have been selected for the patient.                       Final Discharge Disposition Code: 51 - hospice medical facility

## 2023-02-01 NOTE — PLAN OF CARE
Goal Outcome Evaluation:  Plan of Care Reviewed With: patient        Progress: declining  Outcome Evaluation: Bladder scan last evening revealed greater than 999cc. [place]yers cather placed. Tolerated well. Medicated with ativan and dilaudid x1, patient able to sleep afterward. Turns q4. Daughter at bedside. Will continue palliative care.

## 2023-02-01 NOTE — PROGRESS NOTES
"DAILY PROGRESS NOTE  [facility]    Patient Identification:  Name: Maximo Gonzalez  Age: 89 y.o.  Sex: male  :  3/9/1930  MRN: [id number]         Primary Care Physician: Epley, James, APRN      Subjective  Daughter states the patient had complained of feeling abdominal bloating earlier.  Patient sleeping on entering the room.  On awakening and asking he did at 1 point complained of abdominal discomfort.  Later complained of back pain.  She notes she is really gone downhill rapidly last 3 weeks.  Oral intake has been very poor the last 3 weeks.     Objective:  General Appearance:  In no acute distress (Thin, frail.).    Vital signs: (most recent): Blood pressure 132/74, pulse 88, temperature 97 °F (36.1 °C), temperature source Oral, resp. rate 18, height 167.6 cm (66\"), weight 60.8 kg (134 lb), SpO2 96 %.    Lungs:  Normal effort and normal respiratory rate.  Breath sounds clear to auscultation.    Heart: Normal rate.  Regular rhythm.  Positive for murmur.  (Harsh 3/6 systolic murmur precordial.  Loudest at the apex.)  Abdomen: Abdomen is non-distended.  Bowel sounds are normal.   (Questionable lower abdominal tenderness.  Suprapubic area a little on the full side.).     Extremities: There is no dependent edema.    Neurological: (Sleeping on entering the room.  Does awaken briefly and interacts.).    Skin:  Warm and dry.                Vital signs in last 24 hours:  Temp:  [97 °F (36.1 °C)-98.8 °F (37.1 °C)] 97 °F (36.1 °C)  Heart Rate:  [88-95] 88  Resp:  [18-20] 18  BP: (132-154)/(74-81) 132/74    Intake/Output:    Intake/Output Summary (Last 24 hours) at 2023  Last data filed at 2023 1744  Gross per 24 hour   Intake 0 ml   Output 1100 ml   Net -1100 ml         Results from last 7 days   Lab Units 23  0514 23  1421   WBC 10*3/mm3 10.31 13.76*   HEMOGLOBIN g/dL 10.8* 10.2*   PLATELETS 10*3/mm3 251 239     Results from last 7 days   Lab Units 2314 23  1421 "   SODIUM mmol/L 136 132*   POTASSIUM mmol/L 4.3 4.5   CHLORIDE mmol/L 99 97*   CO2 mmol/L 20.7* 21.3*   BUN mg/dL 86* 95*   CREATININE mg/dL 2.25* 2.63*   GLUCOSE mg/dL 124* 173*   Estimated Creatinine Clearance: 18 mL/min (A) (by C-G formula based on SCr of 2.25 mg/dL (H)).  Results from last 7 days   Lab Units 01/31/23  0514 01/30/23  1421   CALCIUM mg/dL 9.8* 10.0*   ALBUMIN g/dL  --  4.2     Results from last 7 days   Lab Units 01/30/23  1421   ALBUMIN g/dL 4.2   BILIRUBIN mg/dL 0.3   ALK PHOS U/L 62   AST (SGOT) U/L 135*   ALT (SGPT) U/L 54*       Assessment:    ST elevation myocardial infarction (STEMI), unspecified artery (HCC)  MICHELLE  Failure to thrive  Chronic pain with history of pain pump: Opioid tolerance.  Cerebrovascular disease  Coronary disease  Peripheral vascular disease  History of partial colectomy    All problems new to this examiner.    Plan:  Check postvoid residual.  Continue comfort measures.    Diaz Mahoney MD  1/31/2023  20:30 EST

## 2023-02-01 NOTE — PROGRESS NOTES
Name: Maximo Gonzalez ADMIT: 2023   : 3/9/1930  PCP: Epley, James, APRN    MRN: [id number] LOS: 2 days   AGE/SEX: 89 y.o. male  ROOM: [facility]     Subjective   Subjective   Goals of care comfort measures.  Patient is requiring Ativan and Dilaudid.    Review of Systems   Unable to obtain due to acuity of condition  Objective   Objective   Vital Signs  Temp:  [97 °F (36.1 °C)-99 °F (37.2 °C)] 99 °F (37.2 °C)  Heart Rate:  [81-88] 81  Resp:  [16-18] 16  BP: (132-156)/(74-79) 156/79  SpO2:  [95 %-96 %] 95 %  on   ;   Device (Oxygen Therapy): room air  Body mass index is 21.63 kg/m².  Physical Exam  Constitutional:       General: He is not in acute distress.     Appearance: He is ill-appearing.   Cardiovascular:      Rate and Rhythm: Normal rate and regular rhythm.   Pulmonary:      Effort: Pulmonary effort is normal. No respiratory distress.   Skin:     General: Skin is warm and dry.         Results Review     I reviewed the patient's new clinical results.  Results from last 7 days   Lab Units 23  0514 23  1421   WBC 10*3/mm3 10.31 13.76*   HEMOGLOBIN g/dL 10.8* 10.2*   PLATELETS 10*3/mm3 251 239     Results from last 7 days   Lab Units 23  0514 23  1421   SODIUM mmol/L 136 132*   POTASSIUM mmol/L 4.3 4.5   CHLORIDE mmol/L 99 97*   CO2 mmol/L 20.7* 21.3*   BUN mg/dL 86* 95*   CREATININE mg/dL 2.25* 2.63*   GLUCOSE mg/dL 124* 173*   Estimated Creatinine Clearance: 18 mL/min (A) (by C-G formula based on SCr of 2.25 mg/dL (H)).  Results from last 7 days   Lab Units 23  1421   ALBUMIN g/dL 4.2   BILIRUBIN mg/dL 0.3   ALK PHOS U/L 62   AST (SGOT) U/L 135*   ALT (SGPT) U/L 54*     Results from last 7 days   Lab Units 23  0514 23  1421   CALCIUM mg/dL 9.8* 10.0*   ALBUMIN g/dL  --  4.2     No results found for: COVID19  Glucose   Date/Time Value Ref Range Status   2023 1413 171 (H) 70 - 130 mg/dL Final     Comment:     Meter: [id number] : [id number] Napoleon Spann  [employer]       CT Head Without Contrast  Narrative: CT HEAD WITHOUT CONTRAST     HISTORY: Fall, hit head, headache.     COMPARISON: CT head 07/15/2010.     FINDINGS: The brain and ventricles are symmetrical. There is no evidence  of hemorrhage, hydrocephalus or of abnormal extra-axial fluid. No focal  area of decreased attenuation to suggest acute infarction is identified.  Moderate-to-severe vascular calcification and moderate small vessel  ischemic disease are noted. Bone windows showed no evidence of a  calvarial fracture.     Impression: Atrophy, vascular calcification and mild-to-moderate small  vessel ischemic disease are noted. There is no evidence of fracture or  of intracranial hemorrhage. A small volume of fluid is present within  the right maxillary sinus.           Radiation dose reduction techniques were utilized, including automated  exposure control and exposure modulation based on body size.     This report was finalized on 1/31/2023 7:10 AM by Dr. Dhiraj Gardner M.D.       Scheduled Medications  aspirin, 81 mg, Oral, Daily  hydrALAZINE, 50 mg, Oral, BID  labetalol, 200 mg, Oral, Daily  lisinopril, 20 mg, Oral, Q24H    Infusions   Diet  Diet: Regular/House Diet; Texture: Regular Texture (IDDSI 7); Fluid Consistency: Thin (IDDSI 0)       Assessment/Plan     Active Hospital Problems    Diagnosis  POA   • **ST elevation myocardial infarction (STEMI), unspecified artery (HCC) [I21.3]  Yes      Resolved Hospital Problems   No resolved problems to display.       89 y.o. male admitted with ST elevation myocardial infarction (STEMI), unspecified artery (HCC).    History of partial colectomy  Peripheral vascular disease  Coronary artery disease with ST elevation myocardial infarction  Chronic pain with history of pain pump and opiate tolerant    Continue comfort measures        Santana Sepulveda MD  [facility]  02/01/23  11:13 EST     I wore protective equipment throughout this patient  encounter including a face mask, gloves and protective eyewear.  Hand hygiene was performed before donning protective equipment and after removal when leaving the room.

## 2023-02-01 NOTE — DISCHARGE SUMMARY
Patient Name: Maximo Gonzalez  : 3/9/1930  MRN: [id number]    Date of Admission: 2023  Date of Discharge:  2023  Primary Care Physician: Epley, James, APRN      Chief Complaint:   Altered Mental Status and Fall      Discharge Diagnoses     Active Hospital Problems    Diagnosis  POA   • **ST elevation myocardial infarction (STEMI), unspecified artery (HCC) [I21.3]  Yes   • Chronic pain syndrome [G89.4]  Yes   • Peripheral vascular disease (HCC) [I73.9]  Yes      Resolved Hospital Problems   No resolved problems to display.        Hospital Course     89-year-old male who came to the hospital after being found down on his home by his family.  He was noted to have an ST elevation myocardial infarction abnormal findings on EKG and troponin to 3.3.  The patient did not want a cardiac catheterization and so after conversation family he was transition to DO NOT RESUSCITATE comfort measures, and was given IV medications for pain and anxiety.    Day of Discharge       Physical Exam:  Temp:  [97 °F (36.1 °C)-99 °F (37.2 °C)] 99 °F (37.2 °C)  Heart Rate:  [81-88] 81  Resp:  [16-18] 16  BP: (132-156)/(74-79) 156/79  Body mass index is 21.63 kg/m².  Physical Exam  Constitutional:       General: He is not in acute distress.     Appearance: He is not toxic-appearing.   HENT:      Head: Normocephalic and atraumatic.   Cardiovascular:      Rate and Rhythm: Normal rate and regular rhythm.   Abdominal:      General: There is no distension.      Palpations: Abdomen is soft.      Tenderness: There is no abdominal tenderness.         Consultants     Consult Orders (all) (From admission, onward)     Start     Ordered    23 1253  Inpatient Hospice / [facility] Consult  Once        Specialty:  Hospice and Palliative Medicine  Provider:  (Not yet assigned)    23 1253    23 1548  IP Palliative Care Nurse Consult  Once        Provider:  (Not yet assigned)    23 1548    23 1532  [facility] (on-call MD unless  specified) Details  Once        Specialty:  Hospitalist  Provider:  (Not yet assigned)    01/30/23 1531    Signed and Held  [facility] (on-call MD unless specified) Details  Once        Specialty:  Hospitalist  Provider:  (Not yet assigned)    Signed and Held              Procedures     Imaging Results (All)     Procedure Component Value Units Date/Time    CT Head Without Contrast [[id number]] Collected: 01/30/23 1527     Updated: 01/31/23 0713    Narrative:      CT HEAD WITHOUT CONTRAST     HISTORY: Fall, hit head, headache.     COMPARISON: CT head 07/15/2010.     FINDINGS: The brain and ventricles are symmetrical. There is no evidence  of hemorrhage, hydrocephalus or of abnormal extra-axial fluid. No focal  area of decreased attenuation to suggest acute infarction is identified.  Moderate-to-severe vascular calcification and moderate small vessel  ischemic disease are noted. Bone windows showed no evidence of a  calvarial fracture.       Impression:      Atrophy, vascular calcification and mild-to-moderate small  vessel ischemic disease are noted. There is no evidence of fracture or  of intracranial hemorrhage. A small volume of fluid is present within  the right maxillary sinus.           Radiation dose reduction techniques were utilized, including automated  exposure control and exposure modulation based on body size.     This report was finalized on 1/31/2023 7:10 AM by Dr. Dhiraj Gardner M.D.       XR Chest 1 View [[id number]] Collected: 01/30/23 1552     Updated: 01/30/23 1558    Narrative:      XR CHEST 1 VW-     HISTORY: Male who is 89 years-old,  trauma     TECHNIQUE: Frontal view of the chest     COMPARISON: 10/20/2017     FINDINGS: The heart size is normal. Aorta is calcified. Pulmonary  vasculature is unremarkable. No focal pulmonary consolidation, pleural  effusion, or pneumothorax. No acute osseous process.       Impression:      No focal pulmonary consolidation. Follow-up as clinical  indications persist.      This report was finalized on 1/30/2023 3:54 PM by Dr. Kit Andino M.D.             Pertinent Labs     Results from last 7 days   Lab Units 01/31/23  0514 01/30/23  1421   WBC 10*3/mm3 10.31 13.76*   HEMOGLOBIN g/dL 10.8* 10.2*   PLATELETS 10*3/mm3 251 239     Results from last 7 days   Lab Units 01/31/23  0514 01/30/23  1421   SODIUM mmol/L 136 132*   POTASSIUM mmol/L 4.3 4.5   CHLORIDE mmol/L 99 97*   CO2 mmol/L 20.7* 21.3*   BUN mg/dL 86* 95*   CREATININE mg/dL 2.25* 2.63*   GLUCOSE mg/dL 124* 173*   Estimated Creatinine Clearance: 18 mL/min (A) (by C-G formula based on SCr of 2.25 mg/dL (H)).  Results from last 7 days   Lab Units 01/30/23  1421   ALBUMIN g/dL 4.2   BILIRUBIN mg/dL 0.3   ALK PHOS U/L 62   AST (SGOT) U/L 135*   ALT (SGPT) U/L 54*     Results from last 7 days   Lab Units 01/31/23  0514 01/30/23  1421   CALCIUM mg/dL 9.8* 10.0*   ALBUMIN g/dL  --  4.2       Results from last 7 days   Lab Units 01/30/23  1421   CK TOTAL U/L 2,612*   TROPONIN T ng/mL 3.310*           Invalid input(s): LDLCALC        Test Results Pending at Discharge       Discharge Details        Discharge Medications      Changes to Medications      Instructions Start Date   benazepril 20 MG tablet  Commonly known as: LOTENSIN  What changed: additional instructions   20 mg, Oral, Daily, For blood pressure      labetalol 200 MG tablet  Commonly known as: NORMODYNE  What changed: when to take this   200 mg, Oral, 2 Times Daily      promethazine 12.5 MG tablet  Commonly known as: PHENERGAN  What changed: additional instructions   6.25 mg, Oral, 2 Times Daily PRN, Caution sedation falls possible         Continue These Medications      Instructions Start Date   aspirin 81 MG tablet   81 mg, Oral, Daily      hydrALAZINE 50 MG tablet  Commonly known as: APRESOLINE   50 mg, Oral, 2 Times Daily      oxyCODONE-acetaminophen 5-325 MG per tablet  Commonly known as: PERCOCET   1 tablet, Oral, Every 4 Hours PRN, For pain         ASK your  doctor about these medications      Instructions Start Date   furosemide 40 MG tablet  Commonly known as: LASIX   40 mg, Oral, Daily      PROBIOTIC DAILY PO   1 tablet, Oral, Daily             Allergies   Allergen Reactions   • Promethazine Hallucinations     Can tolerate PO; cannot tolerate IV. Pt hallucinates.         Discharge Disposition:  Hospice/Medical Facility ([facility])    Discharge Diet:  Diet Order   Procedures   • Diet: Regular/House Diet; Texture: Regular Texture (IDDSI 7); Fluid Consistency: Thin (IDDSI 0)         CODE STATUS:    Code Status and Medical Interventions:   Ordered at: 02/01/23 1116     Code Status (Patient has no pulse and is not breathing):    No CPR (Do Not Attempt to Resuscitate)     Medical Interventions (Patient has pulse or is breathing):    Comfort Measures     Release to patient:    Routine Release       No future appointments.   Follow-up Information     Epley, James, JOVANNY .    Specialties: Nurse Practitioner, Family Medicine  Contact information:  [address]  [address] [address] [phone]             Larry Garza MD .    Specialty: Internal Medicine  Contact information:  [address]  [address] [address] [phone]                         Time Spent on Discharge:  Greater than 30 minutes      Santana Sepulveda MD  [facility]  02/01/23  14:00 EST

## 2023-02-01 NOTE — PROGRESS NOTES
"Nutrition Services    Patient Name:  Maximo Gonzalez  YOB: 1930  MRN: [id number]  Admit Date:  1/30/2023    Assessment Date:  02/01/23    NUTRITION SCREENING      Reason for Encounter MST 3    Diagnosis/Problem STEMI     Patient is getting comfort care although level of care has not been changed. Per MD, prognosis=day. Patient reported to MD that he has felt unwell for a while. S/p fall and generalized weakness noted in H&P. Will follow PRN        PO Diet Diet: Regular/House Diet; Texture: Regular Texture (IDDSI 7); Fluid Consistency: Thin (IDDSI 0)   PO Supplements/Snacks    PO Intake % 0% x 3 meals        Labs  Listed below, reviewed   Physical Findings Somnolent, lethargic    GI Function Last BM 1/31   Skin Status Skin intact        Height:  Weight:  BMI:    Weight Trend Height: 167.6 cm (66\")  Weight: 60.8 kg (134 lb) (01/30/23 1419)  Body mass index is 21.63 kg/m².    Stable       Intervention/Plan Follow PRN          Results from last 7 days   Lab Units 01/31/23  0514 01/30/23  1421   SODIUM mmol/L 136 132*   POTASSIUM mmol/L 4.3 4.5   CHLORIDE mmol/L 99 97*   CO2 mmol/L 20.7* 21.3*   BUN mg/dL 86* 95*   CREATININE mg/dL 2.25* 2.63*   CALCIUM mg/dL 9.8* 10.0*   BILIRUBIN mg/dL  --  0.3   ALK PHOS U/L  --  62   ALT (SGPT) U/L  --  54*   AST (SGOT) U/L  --  135*   GLUCOSE mg/dL 124* 173*     Results from last 7 days   Lab Units 01/31/23  0514   HEMOGLOBIN g/dL 10.8*   HEMATOCRIT % 32.7*     No results found for: COVID19  Lab Results   Component Value Date    HGBA1C 5.9 (H) 08/29/2019       RD to follow up per protocol.    Electronically signed by:  Antonieta Talley RD  02/01/23 10:33 EST  "

## 2023-02-01 NOTE — CONSULTS
HSB Admission: 2/1/23  hospitals ID: [id number]  Diagnosis: MI [I21.3], CAD [I25.10], Renal Artery Disease [I70.1], CVA Hx [Z86.73]. Unrelated PVD, BPH, Colon Cancer  Symptom Management: pain/anxiety    Met with daughters Heather BAIN Jose Elias/KACIE and Nona Moss at bedside. Explanation of services provided with a focus on HSB (hospitals Scattered Bed). Answered all questions, discussed medications, symptom management needs, and goals of care. [facility] services selected. hospitals consent forms completed and signed by POA. hospitals information provided and encouraged to reach out with any needs.    Benefit change completed and copy left with Annalise Miller CCP. hospitals daily visits will begin tomorrow 2/2/23.    Please call with any questions, concerns, or change in patient status. Thank you for allowing us the opportunity to participate in the care of this patient/family.    Jenniffer Avila, RN, BSN  hospitals Admissions  [phone]

## 2023-02-01 NOTE — H&P
Patient Name:  Maximo Gonzalez  YOB: 1930  MRN:  [id number]  Admit Date:  1/30/2023  Patient Care Team:  Epley, James, APRN as PCP - General (Family Medicine)  Larry Garza MD as PCP - Family Medicine      Subjective   History Present Illness     Chief Complaint   Patient presents with   • Altered Mental Status   • Fall       89-year-old male who came to the hospital after being found down on his home by his family.  He was noted to have an ST elevation myocardial infarction abnormal findings on EKG and troponin to 3.3.  The patient did not want a cardiac catheterization and so after conversation family he was transition to DO NOT RESUSCITATE comfort measures, and was given IV medications for pain and anxiety. He was transitioned to a hospice scatter bed on 2/1/23.    Review of Systems   Unable to obtain due to acuity of condition  Personal History     Past Medical History:   Diagnosis Date   • Atherosclerotic heart disease of native coronary artery without angina pectoris     Normal LV systolic function.   • BPH (benign prostatic hypertrophy)    • Chronic pain syndrome    • Colon cancer (HCC)    • Degeneration of cervical intervertebral disc     Cervical disk disease.   • Gastritis    • Hypertension    • Renal artery stenosis (HCC)     renal artery disease   • Stroke (HCC)    • Vascular disease      Past Surgical History:   Procedure Laterality Date   • ATHERECTOMY Right     superficial femoral artery   • CAROTID ENDARTERECTOMY      Right carotid endarterectomy. ReOP 8/11   • CERVICAL LAMINECTOMY     • CHOLECYSTECTOMY     • COLECTOMY PARTIAL / TOTAL      colon resection   • CORONARY ANGIOPLASTY WITH STENT PLACEMENT      Angioplasty and stent placement in LAD and evidence of restenosis w/ repeat angioplasty in April 2002.  Cath in 2005 showed no evidence of restenosis.   • ENDOSCOPY N/A 10/20/2017    Procedure: ESOPHAGOGASTRODUODENOSCOPY FOOD BOLUS;  Surgeon: Dhiraj Ortiz MD;   Location: [facility] OR;  Service:    • LAPAROSCOPIC INGUINAL HERNIA REPAIR Left 2016    Dr. Neil Thomas   • OTHER SURGICAL HISTORY      Bypass Graft (non-vein) iliorenal   • PAIN PUMP INSERTION/REVISION Left     dilaudid    • TURP / TRANSURETHRAL INCISION / DRAINAGE PROSTATE       Family History   Problem Relation Age of Onset   • Cancer Mother         Breast   • No Known Problems Father      Social History     Tobacco Use   • Smoking status: Former     Types: Cigarettes     Quit date: 1986     Years since quittin.0   • Smokeless tobacco: Never   Vaping Use   • Vaping Use: Never used   Substance Use Topics   • Alcohol use: No   • Drug use: No     No current facility-administered medications on file prior to encounter.     Current Outpatient Medications on File Prior to Encounter   Medication Sig Dispense Refill   • aspirin 81 MG tablet Take 81 mg by mouth Daily.     • benazepril (LOTENSIN) 20 MG tablet Take 1 tablet by mouth Daily. For blood pressure (Patient taking differently: Take 20 mg by mouth Daily.) 90 tablet 1   • furosemide (LASIX) 40 MG tablet Take 40 mg by mouth Daily.     • hydrALAZINE (APRESOLINE) 50 MG tablet Take 1 tablet by mouth 2 (Two) Times a Day. 180 tablet 5   • labetalol (NORMODYNE) 200 MG tablet Take 1 tablet by mouth 2 (Two) Times a Day. (Patient taking differently: Take 200 mg by mouth Daily. Indications: High Blood Pressure Disorder, Generic version please) 180 tablet 2   • oxyCODONE-acetaminophen (PERCOCET) 5-325 MG per tablet Take 1 tablet by mouth every 4 (four) hours as needed. For pain     • Probiotic Product (PROBIOTIC DAILY PO) Take 1 tablet by mouth Daily.     • promethazine (PHENERGAN) 12.5 MG tablet Take 0.5 tablets by mouth 2 (Two) Times a Day As Needed for Nausea or Vomiting. Caution sedation falls possible (Patient taking differently: Take 6.25 mg by mouth 2 (Two) Times a Day As Needed for Nausea or Vomiting.) 30 tablet 0     Allergies   Allergen  Reactions   • Promethazine Hallucinations     Can tolerate PO; cannot tolerate IV. Pt hallucinates.       Objective    Objective     Vital Signs  Temp:  [97 °F (36.1 °C)-99 °F (37.2 °C)] 99 °F (37.2 °C)  Heart Rate:  [81-88] 81  Resp:  [16-18] 16  BP: (132-156)/(74-79) 156/79  SpO2:  [95 %-96 %] 95 %  on   ;   Device (Oxygen Therapy): room air  Body mass index is 21.63 kg/m².    Physical Exam  Constitutional:       General: He is not in acute distress.     Appearance: He is not toxic-appearing.   HENT:      Head: Normocephalic and atraumatic.   Cardiovascular:      Rate and Rhythm: Normal rate and regular rhythm.   Pulmonary:      Effort: Pulmonary effort is normal. No respiratory distress.   Abdominal:      General: There is no distension.      Palpations: Abdomen is soft.      Tenderness: There is no abdominal tenderness.         Results Review:  I reviewed the patient's new clinical results.  I reviewed the patient's new imaging results and agree with the interpretation.  I reviewed the patient's other test results and agree with the interpretation  I personally viewed and interpreted the patient's EKG/Telemetry data  Discussed with ED provider.    Lab Results (last 24 hours)     ** No results found for the last 24 hours. **          Imaging Results (Last 24 Hours)     ** No results found for the last 24 hours. **              ECG 12 Lead Rhythm Change   Final Result   HEART RATE= 84  bpm   RR Interval= 714  ms   IA Interval= 235  ms   P Horizontal Axis=   deg   P Front Axis= 90  deg   QRSD Interval= 111  ms   QT Interval= 403  ms   QRS Axis= 91  deg   T Wave Axis= 41  deg   - ABNORMAL ECG -   Sinus rhythm   Prolonged IA interval   Probable inferior infarct, old   Lateral infarct, acute (LAD)   Probable anteroseptal infarct, recent   St elevations are new   Electronically Signed By: Amy MejiaBanner) 30-Jan-2023 15:00:03   Date and Time of Study: 2023-01-30 14:26:05           Assessment/Plan     Active  Hospital Problems    Diagnosis  POA   • **ST elevation myocardial infarction (STEMI), unspecified artery (HCC) [I21.3]  Yes   • Chronic pain syndrome [G89.4]  Yes   • Peripheral vascular disease (HCC) [I73.9]  Yes      Resolved Hospital Problems   No resolved problems to display.     89 year old male admitted with STEMI.     History of partial colectomy  Peripheral vascular disease  Coronary artery disease with ST elevation myocardial infarction  Chronic pain with history of pain pump and opiate tolerant     Continue comfort measures           Santana Sepulveda MD  [facility]  02/01/23  14:03 EST

## 2023-02-02 NOTE — H&P
Patient Name:  Maximo Gonzalez  YOB: 1930  MRN:  [id number]  Admit Date:  2/1/2023  Patient Care Team:  Epley, James, APRN as PCP - General (Family Medicine)  Larry Garza MD as PCP - Family Medicine      Subjective   History Present Illness     No chief complaint on file.      89-year-old male who came to the hospital after being found down on his home by his family.  He was noted to have an ST elevation myocardial infarction abnormal findings on EKG and troponin to 3.3.  The patient did not want a cardiac catheterization and so after conversation family he was transition to DO NOT RESUSCITATE comfort measures, and was given IV medications for pain and anxiety. He was transitioned to a hospice scatter bed on 2/1/23.     Review of Systems   Unable to obtain due to acuity of condition    Personal History     Past Medical History:   Diagnosis Date   • Atherosclerotic heart disease of native coronary artery without angina pectoris     Normal LV systolic function.   • BPH (benign prostatic hypertrophy)    • Chronic pain syndrome    • Colon cancer (HCC)    • Degeneration of cervical intervertebral disc     Cervical disk disease.   • Gastritis    • Hypertension    • Renal artery stenosis (HCC)     renal artery disease   • Stroke (HCC)    • Vascular disease      Past Surgical History:   Procedure Laterality Date   • ATHERECTOMY Right     superficial femoral artery   • CAROTID ENDARTERECTOMY      Right carotid endarterectomy. ReOP 8/11   • CERVICAL LAMINECTOMY     • CHOLECYSTECTOMY     • COLECTOMY PARTIAL / TOTAL      colon resection   • CORONARY ANGIOPLASTY WITH STENT PLACEMENT      Angioplasty and stent placement in LAD and evidence of restenosis w/ repeat angioplasty in April 2002.  Cath in 2005 showed no evidence of restenosis.   • ENDOSCOPY N/A 10/20/2017    Procedure: ESOPHAGOGASTRODUODENOSCOPY FOOD BOLUS;  Surgeon: Dhiraj Ortiz MD;  Location: [facility];  Service:    •  LAPAROSCOPIC INGUINAL HERNIA REPAIR Left 2016    Dr. Neil Thomas   • OTHER SURGICAL HISTORY      Bypass Graft (non-vein) iliorenal   • PAIN PUMP INSERTION/REVISION Left     dilaudid    • TURP / TRANSURETHRAL INCISION / DRAINAGE PROSTATE       Family History   Problem Relation Age of Onset   • Cancer Mother         Breast   • No Known Problems Father      Social History     Tobacco Use   • Smoking status: Former     Types: Cigarettes     Quit date: 1986     Years since quittin.0   • Smokeless tobacco: Never   Vaping Use   • Vaping Use: Never used   Substance Use Topics   • Alcohol use: No   • Drug use: No     Current Facility-Administered Medications on File Prior to Encounter   Medication Dose Route Frequency Provider Last Rate Last Admin   • [DISCONTINUED] acetaminophen (TYLENOL) 160 MG/5ML solution 650 mg  650 mg Oral Q4H PRN Era Woods MD       • [DISCONTINUED] acetaminophen (TYLENOL) 160 MG/5ML solution 650 mg  650 mg Oral Q4H PRN Era Woods MD       • [DISCONTINUED] acetaminophen (TYLENOL) suppository 650 mg  650 mg Rectal Q4H PRN Era Woods MD       • [DISCONTINUED] acetaminophen (TYLENOL) suppository 650 mg  650 mg Rectal Q4H PRN Era Woods MD       • [DISCONTINUED] acetaminophen (TYLENOL) tablet 650 mg  650 mg Oral Q4H PRN Era Woods MD       • [DISCONTINUED] acetaminophen (TYLENOL) tablet 650 mg  650 mg Oral Q4H PRN Era Woods MD       • [DISCONTINUED] aspirin EC tablet 81 mg  81 mg Oral Daily Era Woods MD       • [DISCONTINUED] diphenoxylate-atropine (LOMOTIL) 2.5-0.025 MG per tablet 1 tablet  1 tablet Oral Q2H PRN Era Woods MD       • [DISCONTINUED] Glycerin-Hypromellose- (ARTIFICIAL TEARS) 0.2-0.2-1 % ophthalmic solution solution 1 drop  1 drop Both Eyes Q30 Min PRN Era Woods MD       • [DISCONTINUED] hydrALAZINE (APRESOLINE) tablet 50 mg  50 mg Oral BID Peggy  Era Hernandez MD       • [DISCONTINUED] HYDROmorphone (DILAUDID) injection 1 mg  1 mg Intravenous Q1H PRN Era Woods MD   1 mg at 02/01/23 1508   • [DISCONTINUED] labetalol (NORMODYNE) tablet 200 mg  200 mg Oral Daily Era Woods MD       • [DISCONTINUED] lisinopril (PRINIVIL,ZESTRIL) tablet 20 mg  20 mg Oral Q24H Era Woods MD       • [DISCONTINUED] LORazepam (ATIVAN) 2 MG/ML concentrated solution 0.5 mg  0.5 mg Sublingual Q1H PRN Stinggilberto, Era Hernandez MD       • [DISCONTINUED] LORazepam (ATIVAN) 2 MG/ML concentrated solution 1 mg  1 mg Sublingual Q1H PRN Peggy, Era Hernandez MD       • [DISCONTINUED] LORazepam (ATIVAN) 2 MG/ML concentrated solution 2 mg  2 mg Sublingual Q1H PRN Peggy, Era Hernandez MD       • [DISCONTINUED] LORazepam (ATIVAN) injection 0.5 mg  0.5 mg Intravenous Q1H PRN Era Woods MD   0.5 mg at 01/31/23 0633   • [DISCONTINUED] LORazepam (ATIVAN) injection 0.5 mg  0.5 mg Subcutaneous Q1H PRN Era Woods MD       • [DISCONTINUED] LORazepam (ATIVAN) injection 1 mg  1 mg Intravenous Q1H PRN Era Woods MD   1 mg at 02/01/23 0905   • [DISCONTINUED] LORazepam (ATIVAN) injection 1 mg  1 mg Subcutaneous Q1H PRN Era Woods MD       • [DISCONTINUED] LORazepam (ATIVAN) injection 2 mg  2 mg Intravenous Q1H PRN Era Woods MD       • [DISCONTINUED] LORazepam (ATIVAN) injection 2 mg  2 mg Subcutaneous Q1H PRN Peggy, Era Hernandez MD       • [DISCONTINUED] morphine concentrated solution 10 mg  10 mg Sublingual Q1H PRN Peggy, Era Hernandez MD       • [DISCONTINUED] Morphine sulfate (PF) injection 4 mg  4 mg Intravenous Q1H PRN Peggy, Era Hernandez MD       • [DISCONTINUED] ondansetron (ZOFRAN) injection 4 mg  4 mg Intravenous Q6H PRN Era Woods MD       • [DISCONTINUED] simethicone (MYLICON) chewable tablet 80 mg  80 mg Oral 4x Daily PRN Diaz Mahoney MD   80 mg at 01/31/23 1918     Current  Outpatient Medications on File Prior to Encounter   Medication Sig Dispense Refill   • aspirin 81 MG tablet Take 81 mg by mouth Daily.     • benazepril (LOTENSIN) 20 MG tablet Take 1 tablet by mouth Daily. For blood pressure (Patient taking differently: Take 20 mg by mouth Daily.) 90 tablet 1   • furosemide (LASIX) 40 MG tablet Take 40 mg by mouth Daily.     • hydrALAZINE (APRESOLINE) 50 MG tablet Take 1 tablet by mouth 2 (Two) Times a Day. 180 tablet 5   • labetalol (NORMODYNE) 200 MG tablet Take 1 tablet by mouth 2 (Two) Times a Day. (Patient taking differently: Take 200 mg by mouth Daily. Indications: High Blood Pressure Disorder, Generic version please) 180 tablet 2   • oxyCODONE-acetaminophen (PERCOCET) 5-325 MG per tablet Take 1 tablet by mouth every 4 (four) hours as needed. For pain     • Probiotic Product (PROBIOTIC DAILY PO) Take 1 tablet by mouth Daily.     • promethazine (PHENERGAN) 12.5 MG tablet Take 0.5 tablets by mouth 2 (Two) Times a Day As Needed for Nausea or Vomiting. Caution sedation falls possible (Patient taking differently: Take 6.25 mg by mouth 2 (Two) Times a Day As Needed for Nausea or Vomiting.) 30 tablet 0     Allergies   Allergen Reactions   • Promethazine Hallucinations     Can tolerate PO; cannot tolerate IV. Pt hallucinates.       Objective    Objective     Vital Signs  Temp:  [96.8 °F (36 °C)-97.8 °F (36.6 °C)] 97.8 °F (36.6 °C)  Heart Rate:  [84-86] 84  Resp:  [14] 14  BP: (115-124)/(60-67) 115/60  SpO2:  [95 %-96 %] 95 %  on   ;   Device (Oxygen Therapy): room air  There is no height or weight on file to calculate BMI.    Physical Exam  Constitutional:       General: He is not in acute distress.     Appearance: He is not toxic-appearing.   Cardiovascular:      Rate and Rhythm: Normal rate and regular rhythm.   Pulmonary:      Effort: Pulmonary effort is normal. No respiratory distress.   Musculoskeletal:      Right lower leg: No edema.      Left lower leg: No edema.   Skin:      Coloration: Skin is not jaundiced.      Findings: No lesion.   Neurological:      Comments: Sedated, unable to assess   Psychiatric:      Comments: Sedated, unable to assess          Results Review:  I reviewed the patient's new clinical results.  I reviewed the patient's new imaging results and agree with the interpretation.  I reviewed the patient's other test results and agree with the interpretation  I personally viewed and interpreted the patient's EKG/Telemetry data  Discussed with ED provider.    Lab Results (last 24 hours)     ** No results found for the last 24 hours. **          Imaging Results (Last 24 Hours)     ** No results found for the last 24 hours. **              No orders to display        Assessment/Plan     Active Hospital Problems    Diagnosis  POA   • **End of life care [Z51.5]  Not Applicable      Resolved Hospital Problems   No resolved problems to display.     Myocardial infarct  Coronary artery disease  History of CVA  BPH  Colon cancer    Transition to hospice on 2/1.  Continue comfort measures with IV medications for pain and anxiety    CODE STATUS: DO NOT RESUSCITATE comfort measures only    Santana Sepulveda MD  [facility]  02/02/23  10:23 EST

## 2023-02-02 NOTE — PROGRESS NOTES
Discharge Planning Assessment  [facility]     Patient Name: Maximo Gonzalez  MRN: [id number]  Today's Date: 2/2/2023    Admit Date: 2/1/2023        Discharge Needs Assessment    No documentation.                Discharge Plan     Row Name 02/02/23 1624       Plan    Plan Comments The patient has a PPS 10%. CCP and [facility] is following. ALDA Miller RN, CCP.              Continued Care and Services - Admitted Since 2/1/2023     Destination Coordination complete.    Service Provider Request Status Selected Services Address Phone Fax Patient Preferred    [facility] [address] [place] [id number] [phone] --            Selected Continued Care - Prior Encounters Includes continued care and service providers with selected services from prior encounters from 11/3/2022 to 2/2/2023    Discharged on 2/1/2023 Admission date: 1/30/2023 - Discharge disposition: Swing Bed w/Planned Readmission    Destination     Service Provider Selected Services Address Phone Fax Patient Preferred    [facility] [address] [place] [phone] --                    Expected Discharge Date and Time     Expected Discharge Date Expected Discharge Time    Feb 7, 2023          Demographic Summary    No documentation.                Functional Status    No documentation.                Psychosocial    No documentation.                Abuse/Neglect    No documentation.                Legal    No documentation.                Substance Abuse    No documentation.                Patient Forms    No documentation.                   Annalise Miller RN

## 2023-02-02 NOTE — PLAN OF CARE
Goal Outcome Evaluation:              Outcome Evaluation: After discussion with patient's daughter decision was made to medicate q4 hours prior to repositioning wih ativan and dilaudid. Patient has rested appearing comfortable, tolerating turns well. Will continue palliative care.

## 2023-02-02 NOTE — PLAN OF CARE
Goal Outcome Evaluation:  Plan of Care Reviewed With: family        Progress: declining  Outcome Evaluation: PPS 10%, Pt less responsive today, nonverbal. Family signed hospice consents this evening, Pt admitted as hospice scattered bed. Dilaudid increased to 2mg, ativan given as needed. Little urine output. Education and support regarding decline provided to family at bedside, they are accepting and understanding. No needs at this time.

## 2023-02-02 NOTE — PROGRESS NOTES
[facility] Bed Team SW met with patient and family to explain role of team SW and assess for needs. Patient was lying in his hospital bed in the recovery position with his two daughters at the bedside. SW sat with family to offer support and provided an opportunity for family to share their feelings related to patient's decline. Family has made the decision for comfort focused care. SW provided family with a care blanket as well as a [employer] pendant.    DIscussed  planning and family has selected [facility] [address].    Disposition- family would like for patient to remain in a SB for EOL care. If patient were to stabilize and need to be discharged, patient would need to discharge to a LTC facility or home with family and hospice to follow.    SW will visit on a weekly and PRN visit to offer EOL support and assist with needs.    David Gresham, TAIWO  [facility]

## 2023-02-02 NOTE — PLAN OF CARE
Palliative care patient pps 10% resting comfortably in bed. Medicated q4h with 2mg ativan and 2mg dilaudid. F/c to bedside drainage draining clear yellow urine. Family at bedside. No current signs or reports of distress.

## 2023-02-03 PROBLEM — I70.1 ATHEROSCLEROSIS OF RENAL ARTERY (HCC): Status: ACTIVE | Noted: 2023-01-01

## 2023-02-03 PROBLEM — I25.10 CORONARY ATHEROSCLEROSIS OF NATIVE CORONARY ARTERY: Status: ACTIVE | Noted: 2023-01-01

## 2023-02-03 NOTE — PROGRESS NOTES
[facility] Visit Report    Maximo Gonzalez  [id number]  2/2/2023    Admission R/T [facility] Dx: Yes    Reason for [facility] Admission: ST elevation myocardial infarction of unspecified site    Review of Visit: Patient is a 89yo male [facility] elevation myocardial infarction of unspecified site. Admitted to [facility] for GIP for EOL care and symptom management of pain, dyspnea and anxiety.    PPS: 10%, bed bound, oral care only, minimally responsive to pain.     VS: 97.8, 84, 14, 115/60, 95% on room air.     Medications in 24 hours:  -IV Dilaudid 1mg x8  -IV Ativan 1mg x5  -IV Ativan 2mg x2    Recommendations:  Continue to monitor for signs of decline and provide comfort measures. Contact [facility] at [phone] with any questions or concerns and at TOD.     Assessment:  Patient is bed bound, oral care only, minimally responsive to pain, PPS 10%. Family report patient is legally blind and significantly hard of hearing. Respirations are shallow and unlabored with diminished lung sounds. Abdomen is soft with hypoactive bowel sounds, incontinent. Extremities are warm to touch with scattered bruising and scabs. Ayers catheter to bedside drainage with dark yellow urine output, 600mL documented in 24 hours. No signs of pain or distress with the routine administration and continued titration of comfort medications.     Collaboration:  Spoke with pts daughter and granddaughter at the bedside with condition update and support provided. Training provided regarding assessment findings, disease progression, symptom management and expected length of stay. Family v/u of pts condition and all questions were answered. Collaborated with [facility] RN and CCP about pts condition.    Disposition:  Patient meets GIP criteria d/t frequent administration and continued titration of IV medications to achieve and maintain symptom management. Patient appears to be unsafe for transport at this time, requiring increasing symptom  management and frequent RN assessment. If patient were to stabilize he would either require LTC placement d/t increased daily care needs or return home with family and [facility] support. Will continue [facility] RN visits to monitor for changes, assess needs and provide support.       Lashon Chowdhury, RN  [facility] Visit Nurse  Scattered Bed Team

## 2023-02-03 NOTE — PROGRESS NOTES
[facility] Visit Report    Maximo Gonzalez  [id number]  2/3/2023    Admission R/T [facility] Dx: Yes     Reason for [facility] Admission: ST elevation myocardial infarction of unspecified site     Review of Visit: Patient is a 89yo male with a primary [facility] diagnosis of ST elevation myocardial infarction of unspecified site. Admitted to [facility] for GIP for EOL care and symptom management of pain, dyspnea and anxiety.     PPS: 10%, bed bound, oral care only, unresponsive     VS: 100.6, 98, 12, 108/60, 95% on room air.      Medications in 24 hours:  -IV Dilaudid 2mg x6  -IV Ativan 2mg x4     Recommendations:  Family report pts right eye is open and has started watering intermittently. Discussed utilizing eye drops for moisture with family and RN. Facility staff also reported implanted pain pump that is still active to pts back. Family report that pump was filled two weeks ago and is still active. [facility] RN to review chart for more information about pain pump. Continue to monitor for signs of decline and provide comfort measures. Contact [facility] at [phone] with any questions or concerns and at TOD.      Assessment:  Patient is bed bound, oral care only, unresponsive, PPS 10%. Family report patient is legally blind and significantly hard of hearing. Respirations are shallow and unlabored with diminished lung sounds. Abdomen is soft with hypoactive bowel sounds, incontinent. Extremities are warm to touch with scattered bruising and scabs. Ayers catheter to bedside drainage with dark yellow urine output, 750mL documented in 24 hours. No signs of pain or distress with the routine administration and continued titration of comfort medications.      Collaboration:  Spoke with pts daughters at the bedside with condition update and support provided. Training provided regarding assessment findings, disease progression, symptom management and expected length of stay. Family v/u of pts condition and all questions were answered.  Collaborated with [facility] RN and CCP about pts condition.     Disposition:  Patient meets GIP criteria d/t frequent administration and continued titration of IV medications to achieve and maintain symptom management. Patient appears to be unsafe for transport at this time, requiring increasing symptom management and frequent RN assessment. If patient were to stabilize he would either require LTC placement d/t increased daily care needs or return home with family and [facility] support. Will continue [facility] RN visits to monitor for changes, assess needs and provide support.       Lashon Chowdhury RN  [facility] Visit Nurse  Scattered Bed Team

## 2023-02-03 NOTE — PLAN OF CARE
Goal Outcome Evaluation:  Plan of Care Reviewed With: patient, family   Patient is unresponsive. 2 mg of dilaudid and 2 mg of ativan given prior to turns. He appears to be comfortable at this time. Family remain at the bedside. Will continue to monitor.

## 2023-02-03 NOTE — NURSING NOTE
Per family patient has active pain pump last filled by Dr. Brar 2 weeks ago with dilaudid 11.5 mg/day.

## 2023-02-03 NOTE — PROGRESS NOTES
Palliative Care/Hospice Follow Up Note       LOS: 2 days   Patient Care Team:  Epley, James, APRN as PCP - General (Family Medicine)  Larry Garza MD as PCP - Family Medicine    Chief Complaint: Unable to provide    Interval History:   Patient is resting comfortably.  Nursing notes reviewed for the last 24 hours.  He remains less responsive today and nonverbal.  Currently medicating every 4 hours prior to repositioning with Ativan and Dilaudid.    Palliative Performance Scale  Palliative Performance Scale Score: 10%  [place] Symptom Assessment System Revised  Pain Score: no pain   ESAS Tiredness Score: Worst possible tiredness  ESAS Nausea Score: No nausea  ESAS Depression Score: unable to assess  ESAS Anxiety Score: No anxiety  ESAS Drowsiness Score: Worst possible drowsiness  ESAS Lack of Appetite Score: Worst lack of appetite  ESAS Wellbeing Score: Worst wellbeing  ESAS Dyspnea Score: 1  ESAS Other Problem Score: unable to assess  ESAS Source of Information: healthcare professional caregiver  ESAS Intervention: medicated/see MAR  ESAS Intervention Response: tolerated      Review of Systems: Review of systems could not be obtained due to patient nonverbal.       Vital Signs  Temp:  [97.2 °F (36.2 °C)-100.6 °F (38.1 °C)] 100.6 °F (38.1 °C)  Heart Rate:  [95-98] 98  Resp:  [12-14] 12  BP: (108-118)/(60-62) 108/60  Device (Oxygen Therapy): room air SpO2:  [95 %] 95 %    Physical Exam:  General Appearance:    Not awake and in no acute distress     Throat:   No oral lesions, no thrush, oral mucosa moist     Neck:   No adenopathy, supple, trachea midline   Lungs:     Clear to auscultation, respirations regular, even and not labored      Heart:    Regular rhythm and normal rate   Abdomen:     Occasional bowel sounds, no masses, no organomegaly, soft and non-tender, non-distended     Extremities:   No edema, no cyanosis     Pulses:   Radial pulses palpable and equal bilaterally          Results Review:     I  reviewed the patient's new clinical results.    Medication Reviewed.        •  acetaminophen **OR** acetaminophen **OR** acetaminophen  •  acetaminophen **OR** acetaminophen **OR** acetaminophen  •  diphenoxylate-atropine  •  Glycerin-Hypromellose-  •  HYDROmorphone **OR** Morphine **OR** morphine  •  HYDROmorphone **OR** HYDROmorphone  •  LORazepam **OR** LORazepam **OR** LORazepam  •  LORazepam **OR** LORazepam **OR** LORazepam  •  LORazepam **OR** LORazepam **OR** LORazepam  •  ondansetron  •  simethicone      Assessment & Plan       ST elevation myocardial infarction (STEMI), unspecified artery (HCC)    End of life care    Coronary atherosclerosis of native coronary artery    Atherosclerosis of renal artery (HCC)  89-year-old male who came to the hospital after being found down on his home by his family.  He was noted to have an ST elevation myocardial infarction abnormal findings on EKG and troponin to 3.3.  The patient did not want a cardiac catheterization and so after conversation family he was transition to DO NOT RESUSCITATE comfort measures, and was given IV medications for pain and anxiety. He was transitioned to a hospice scatter bed on 2/1/23.  Remains on medications for comfort.  Medication reviewed over the last 24 hours including:  -IV Dilaudid 2mg x6  -IV Ativan 2mg x4  Based on palliative performance index remains hours to days.  Currently in hospice scatter bed and anticipating remaining at this level of care.    Plan for disposition:Hospice scatter bed, hours to days remaining    Dhiraj San MD  02/03/23  14:53 EST

## 2023-02-04 NOTE — PROGRESS NOTES
[facility] Visit Report    Maximo Gonzalez  [id number]  2/4/2023    Admission R/T [facility] Dx: YES      Reason for [facility] Admission: ST elevation myocardial infarction of unspecified site-admitted to [facility] for EOL care with symptom management.      Symptom  Management: Pain, dyspnea and anxiety      Nursing/Medication Recommendations: Please contact [facility] at [phone] for any questions or concerns and continue to provide comfort care.      Psychosocial Issues and Recommendations: Provide support to patient and family      Spiritual Concerns and Recommendations: None at present      [facility] Dis[facility] Plans:  None, patient in dying process and unstable for transport. [facility]quires [facility] assessment for symptom management of pain, dyspnea and anxiety using IV medications, titrating doses as needed to maintain comfort. Patient is meeting criteria for GIP level of care.      Review of Visit: Arrived on unit. Spoke to staff DOMINIQUE Manjarrez for report and reviewed Epic notes. Entered room and patient lying in bed. Unresponsive to touch and to call of name. Color pale to ashen, nailbeds dusky. Right eye slightly reddened. Breathing shallow, unlabored with 02 sat 93% on room air. PPS 10%, oral care only, bed bound. VS 97.4-424-/64. Left arm IV site intact. F/C to BSD with dark yellow urine noted. Spoke to family at bedside regarding EOL care with symptom management and they are aware and accepting. Emotional support provided. Discussed care needs with staff RN and patient is receiving IV Dilaudid 2mg and IV Ativan 2mg x 6 doses each in the last 24 hours. Appears comfortable and peaceful with IV administration of medication. [facility] will continue to follow to assess needs, monitor status and offer support.        Soraida Murdock RN  [facility] Visit Nurse  Scattered Bed Team

## 2023-02-04 NOTE — PROGRESS NOTES
Palliative Care/Hospice Follow Up Note       LOS: 3 days   Patient Care Team:  Epley, James, APRN as PCP - General (Family Medicine)  Larry Garza MD as PCP - Family Medicine    Chief Complaint: Unable to provide    Interval History:   Patient is resting comfortably.  Nursing notes reviewed for the last 24 hours.  He remains less responsive today and nonverbal.  Currently medicating every 4 hours prior to repositioning with Ativan and Dilaudid.  Family concerned about crusty drainage from his right.  It does not close all the way and does seem to be getting irritated.    Palliative Performance Scale  Palliative Performance Scale Score: 10%  [place] Symptom Assessment System Revised  Pain Score: no pain   ESAS Tiredness Score: Worst possible tiredness  ESAS Nausea Score: No nausea  ESAS Depression Score: unable to assess  ESAS Anxiety Score: No anxiety  ESAS Drowsiness Score: Worst possible drowsiness  ESAS Lack of Appetite Score: Worst lack of appetite  ESAS Wellbeing Score: Worst wellbeing  ESAS Dyspnea Score: 6  ESAS Other Problem Score: unable to assess  ESAS Source of Information: healthcare professional caregiver  ESAS Intervention: medicated/see MAR  ESAS Intervention Response: tolerated      Review of Systems: Review of systems could not be obtained due to patient nonverbal.       Vital Signs  Temp:  [97.8 °F (36.6 °C)] 97.8 °F (36.6 °C)  Heart Rate:  [104-106] 106  Resp:  [11-12] 12  BP: ()/(55-64) 103/64  Device (Oxygen Therapy): room air SpO2:  [93 %] 93 %    Physical Exam:  General Appearance:    Not awake and in no acute distress     Throat:   No oral lesions, no thrush, oral mucosa moist     Neck:   No adenopathy, supple, trachea midline   Lungs:     Clear to auscultation, respirations regular, even and not labored      Heart:    Regular rhythm and normal rate   Abdomen:     Occasional bowel sounds, no masses, no organomegaly, soft and non-tender, non-distended     Extremities:   No edema,  no cyanosis     Pulses:   Radial pulses palpable and equal bilaterally          Results Review:     I reviewed the patient's new clinical results.    Medication Reviewed.  sodium chloride, 10 mL, Intravenous, Q12H         •  acetaminophen **OR** acetaminophen **OR** acetaminophen  •  acetaminophen **OR** acetaminophen **OR** acetaminophen  •  diphenoxylate-atropine  •  Glycerin-Hypromellose-  •  HYDROmorphone **OR** Morphine **OR** morphine  •  HYDROmorphone **OR** HYDROmorphone  •  LORazepam **OR** LORazepam **OR** LORazepam  •  LORazepam **OR** LORazepam **OR** LORazepam  •  LORazepam **OR** LORazepam **OR** LORazepam  •  ondansetron  •  simethicone  •  sodium chloride      Assessment & Plan       ST elevation myocardial infarction (STEMI), unspecified artery (HCC)    End of life care    Coronary atherosclerosis of native coronary artery    Atherosclerosis of renal artery (HCC)  89-year-old male who came to the hospital after being found down on his home by his family.  He was noted to have an ST elevation myocardial infarction abnormal findings on EKG and troponin to 3.3.  The patient did not want a cardiac catheterization and so after conversation family he was transition to DO NOT RESUSCITATE comfort measures, and was given IV medications for pain and anxiety. He was transitioned to a hospice scatter bed on 2/1/23.  Remains on medications for comfort.  Medication reviewed over the last 24 hours including:  -IV Dilaudid 2mg x 6  -IV Ativan 2mg x 6  -Scheduled ointment for his right eye has been ordered.  No evidence of infection.  Based on palliative performance index remains hours to days.  Currently in hospice scatter bed and anticipating remaining at this level of care.    Plan for disposition:Hospice scatter bed, hours to days remaining    Dhiraj San MD  02/04/23  09:58 EST

## 2023-02-04 NOTE — PLAN OF CARE
Goal Outcome Evaluation:  Plan of Care Reviewed With: patient, family        Progress: declining  Outcome Evaluation: Pt unresponsive, with shallow, quiet, breathing (apnea at times).  Eye ointment started for right eye.  Medicated prior to turns with Dilaudid and Ativan. Family at bedside.  Purple folder with unit information given today.

## 2023-02-04 NOTE — PLAN OF CARE
Goal Outcome Evaluation:  Plan of Care Reviewed With: patient, family   Patient Is unresponsive. Ativan and dilaudid given prior to turns for symptom management. He is have periods of apnea this shift. Family remain at the bedside. Will monitor.

## 2023-02-05 NOTE — PLAN OF CARE
Goal Outcome Evaluation:  Plan of Care Reviewed With: patient, family   Patient is unresponsive. His breathing is shallow with periods of apnea.  Ativan and dilaudid given prior to turns. Family remain at the bedside. Will monitor

## 2023-02-05 NOTE — PLAN OF CARE
Goal Outcome Evaluation:  Plan of Care Reviewed With: patient, family        Progress: declining  Outcome Evaluation: Unresponsive with quiet, shallow breathing.  Right eye open during sleep, and draining.  Appears comfortable.  Medicated with Dilaudid and Ativan prior to turns.  Family at bedside.

## 2023-02-05 NOTE — PROGRESS NOTES
[facility] Visit Report    Maximo Gonzalez  [id number]  2/5/2023    Admission R/T [facility] Dx: YES      Reason for [facility] Admission: ST elevation myocardial infarction of unspecified site-admitted to [facility] for EOL care with symptom management.      Symptom  Management: Pain, dyspnea and anxiety      Nursing/Medication Recommendations: Please contact [facility] at [phone] for any questions or concerns and continue to provide comfort care.      Psychosocial Issues and Recommendations: Provide support to patient and family      Spiritual Concerns and Recommendations: None at present      [facility] Discharge Plans:  None, patient in dying process and unstable for transport. Requires [facility] RN assessment for symptom management of pain, dyspnea and anxiety using IV medications, titrating doses as needed to maintain comfort. Patient is meeting criteria for [facility] level of care.       Review of Visit: Arrived on unit. Spoke to staff DOMINIQUE Manjarrez for report and reviewed Epic notes. Entered room and patient lying in bed on his side. Unresponsive to touch and to call of name. Color pale to ashen, nailbeds dusky. Right eye slightly reddened. Breathing shallow, unlabored with 02 sat 91% on room air. PPS 10%, oral care only, bed bound. VS 97.8-100-8-86/55. Left arm IV site intact. F/C to BSD with dark yellow urine noted. Close monitoring for safety, [facility] RN visit, comfort care. Spoke to daughter at bedside regarding EOL care with symptom management. Emotional support provided. Discussed care needs with staff DOMINIQUE Manjarrez and patient has received IV Dilaudid 2mg and IV Ativan 2mg x 6 doses each in last 24 hours. Patient appears comfortable and peaceful during visit with IV administration of medication. [facility] will continue to follow to assess needs, monitor status and offer support.        Soraida Murdock RN  [facility] Visit Nurse  Scattered Bed Team

## 2023-02-05 NOTE — PROGRESS NOTES
Palliative Care/Hospice Follow Up Note       LOS: 4 days   Patient Care Team:  Epley, James, APRN as PCP - General (Family Medicine)  Larry Garza MD as PCP - Family Medicine    Chief Complaint: Unable to provide    Interval History:   Patient is resting comfortably.  Nursing notes reviewed for the last 24 hours.  He remains less responsive today and nonverbal.  Currently medicating every 4 hours prior to repositioning with Ativan and Dilaudid.  Eye ointment initiated yesterday    Palliative Performance Scale  Palliative Performance Scale Score: 10%  [place] Symptom Assessment System Revised  Pain Score: no pain   ESAS Tiredness Score: Worst possible tiredness  ESAS Nausea Score: No nausea  ESAS Depression Score: unable to assess  ESAS Anxiety Score: No anxiety  ESAS Drowsiness Score: Worst possible drowsiness  ESAS Lack of Appetite Score: Worst lack of appetite  ESAS Wellbeing Score: Worst wellbeing  ESAS Dyspnea Score: No shortness of breath  ESAS Other Problem Score: unable to assess  ESAS Source of Information: healthcare professional caregiver  ESAS Intervention: medicated/see MAR  ESAS Intervention Response: tolerated      Review of Systems: Review of systems could not be obtained due to patient nonverbal.       Vital Signs  Temp:  [97.6 °F (36.4 °C)-97.8 °F (36.6 °C)] 97.6 °F (36.4 °C)  Heart Rate:  [] 100  Resp:  [8-12] 12  BP: (86-98)/(55-62) 86/55  Device (Oxygen Therapy): room air SpO2:  [91 %-96 %] 91 %    Physical Exam:  General Appearance:    Not awake and in no acute distress     Throat:   No oral lesions, no thrush, oral mucosa moist     Neck:   No adenopathy, supple, trachea midline   Lungs:     Clear to auscultation, respirations regular, even and not labored      Heart:    Regular rhythm and normal rate   Abdomen:     Occasional bowel sounds, no masses, no organomegaly, soft and non-tender, non-distended     Extremities:   No edema, no cyanosis     Pulses:   Radial pulses palpable and  equal bilaterally          Results Review:     I reviewed the patient's new clinical results.    Medication Reviewed.  artificial tears, , Right Eye, Q4H  sodium chloride, 10 mL, Intravenous, Q12H         •  acetaminophen **OR** acetaminophen **OR** acetaminophen  •  acetaminophen **OR** acetaminophen **OR** acetaminophen  •  diphenoxylate-atropine  •  Glycerin-Hypromellose-  •  HYDROmorphone **OR** Morphine **OR** morphine  •  HYDROmorphone **OR** HYDROmorphone  •  LORazepam **OR** LORazepam **OR** LORazepam  •  LORazepam **OR** LORazepam **OR** LORazepam  •  LORazepam **OR** LORazepam **OR** LORazepam  •  ondansetron  •  simethicone  •  sodium chloride      Assessment & Plan       ST elevation myocardial infarction (STEMI), unspecified artery (HCC)    End of life care    Coronary atherosclerosis of native coronary artery    Atherosclerosis of renal artery (HCC)  89-year-old male who came to the hospital after being found down on his home by his family.  He was noted to have an ST elevation myocardial infarction abnormal findings on EKG and troponin to 3.3.  The patient did not want a cardiac catheterization and so after conversation family he was transition to DO NOT RESUSCITATE comfort measures, and was given IV medications for pain and anxiety. He was transitioned to a hospice scatter bed on 2/1/23.  Remains on medications for comfort.  Medication reviewed over the last 24 hours including:  -IV Dilaudid 2mg x 6  -IV Ativan 2mg x 6  -Scheduled ointment for his right eye has been ordered.  No evidence of infection.  Based on palliative performance index remains hours to days.  Currently in hospice scatter bed and anticipating remaining at this level of care.    Plan for disposition:Hospice scatter bed, hours to days remaining    Dhiraj San MD  02/05/23  10:18 EST

## 2023-02-07 NOTE — PROGRESS NOTES
Case Management Discharge Note      Final Note: The patient  on 23 @ 00:34. BDean Miller RN, CCP.         Selected Continued Care - Discharged on 2023 Admission date: 2023 - Discharge disposition:     Destination Coordination complete.    Service Provider Selected Services Address Phone Fax Patient Preferred    [facility] [address], [facility] [id number] [phone] --          Durable Medical Equipment    No services have been selected for the patient.              Dialysis/Infusion    No services have been selected for the patient.              Home Medical Care    No services have been selected for the patient.              Therapy    No services have been selected for the patient.              Community Resources    No services have been selected for the patient.              Community & DME    No services have been selected for the patient.                Selected Continued Care - Prior Encounters Includes continued care and service providers with selected services from prior encounters from 11/3/2022 to 2023    Discharged on 2023 Admission date: 2023 - Discharge disposition: Swing Bed w/Planned Readmission    Destination     Service Provider Selected Services Address Phone Fax Patient Preferred    [facility] [address], [facility] [id number] [phone] --                         Final Discharge Disposition Code: [id number] -  in medical facility

## 2023-02-09 NOTE — DISCHARGE SUMMARY
Discharge As      Date of Admisssion:  2023  Date of Death:  2023  Time of Death:  12:34 AM    Patient Care Team:  Epley, James, APRN as PCP - General (Family Medicine)  Larry Garza MD as PCP - Family Medicine    Final Diagnosis:   STEMI  HTN  HLD  CAD  Chronic Pain Syndrom         Presenting Problem/History of Present Illness  End of life care [Z51.5]      Hospital Course  89-year-old male who came to the hospital after being found down on his home by his family.  He was noted to have an ST elevation myocardial infarction abnormal findings on EKG and troponin to 3.3.  The patient did not want a cardiac catheterization and so after conversation family he was transition to DO NOT RESUSCITATE comfort measures, and was given IV medications for pain and anxiety. He was transitioned to a hospice scatter bed on 23.  He was continued on medications for comfort including Dilaudid and Ativan and ultimately passed away on  at 12:34 AM    Dhiraj San MD  23  15:35 EST

## 2023-02-09 NOTE — PROGRESS NOTES
Enter Query Response Below      Query Response: Unable to determine             If applicable, please update the problem list.      Patient: Maximo Gonzalez        : 3/9/1930  Account: [id number]           Admit Date: 2023        How to Respond to this query:       a. Click New Note     b. Answer query within the yellow box.                c. Update the Problem List, if applicable.      If you have any questions about this query contact me at: [email or web address]    Dr. Sepulveda,    Patient was admitted with a STEMI and noted to have MICHELLE.  Baseline creatinine is unknown.  Creatinine 2.63 (), 2.25 ().  Treatment included IV fluids and monitoring labs and vital signs.  Goals of care changed to palliative care only.    After study, is acute kidney injury clinically supported during this admission?    Yes (please include additional clinical indicators) :__________________  No  Other (please specify): __________________  Unable to determine    KDIGO criteria www.kdigo.org      By submitting this query, we are merely seeking further clarification of documentation to accurately reflect all conditions that you are monitoring, evaluating, treating or that extend the hospitalization or utilize additional resources of care. Please utilize your independent clinical judgment when addressing the question(s) above.     This query and your response, once completed, will be entered into the legal medical record.    Sincerely,  Anita Sarmiento  Clinical Documentation Integrity Program

## (undated) DEVICE — TBG 02 CRUSH RESIST LF CLR 7FT

## (undated) DEVICE — ESOPHAGEAL BALLOON DILATATION CATHETER: Brand: CRE FIXED WIRE

## (undated) DEVICE — Device: Brand: DEFENDO AIR/WATER/SUCTION AND BIOPSY VALVE

## (undated) DEVICE — NET RESCUENET F/B RETRV

## (undated) DEVICE — CANN NASL CO2 TRULINK W/O2 A/

## (undated) DEVICE — DEV INFL CRE STERIFLATE 60CC DISP

## (undated) DEVICE — TUBING, SUCTION, 1/4" X 10', STRAIGHT: Brand: MEDLINE

## (undated) DEVICE — FRCP BX RADJAW4 NDL 2.8 240CM LG OG BX40

## (undated) DEVICE — THE DISPOSABLE OVERTUBE IS USED IN CONJUNCTION WITH A FLEXIBLE ENDOSCOPE FOR FOREIGN BODY OR TISSUE RETRIEVAL, AND/OR FOR ENDOSCOPIC PROCEDURES REQUIRING MULTIPLE ENDOSCOPE INTUBATIONS.: Brand: GUARDUS

## (undated) DEVICE — THE DISPOSABLE RAPTOR GRASPING DEVICE IS USED TO GRASP TISSUE AND/OR RETRIEVE FOREIGN BODIES, EXCISED TISSUE AND STENTS DURING ENDOSCOPIC PROCEDURES.: Brand: RAPTOR

## (undated) DEVICE — BITEBLOCK OMNI BLOC